# Patient Record
Sex: FEMALE | Race: WHITE | Employment: FULL TIME | URBAN - METROPOLITAN AREA
[De-identification: names, ages, dates, MRNs, and addresses within clinical notes are randomized per-mention and may not be internally consistent; named-entity substitution may affect disease eponyms.]

---

## 2017-01-17 LAB
HEPATITIS B SURFACE ANTIGEN INTERPRETATION: NORMAL
HEPATITIS C ANTIBODY INTERPRETATION: NORMAL

## 2017-01-18 LAB
HIV-1 AND HIV-2 ANTIBODIES: NORMAL
RPR: NORMAL

## 2018-03-16 LAB
HPV COMMENT: NORMAL
HPV TYPE 16: NOT DETECTED
HPV TYPE 18: NOT DETECTED
HPVOH (OTHER TYPES): NOT DETECTED

## 2020-05-15 LAB
GONADOTROPIN, CHORIONIC (HCG) QUANT: <5 MIU/ML
PROLACTIN: 12.9 NG/ML

## 2021-05-07 LAB
ABO/RH: NORMAL
ANTIBODY SCREEN: NORMAL
HEPATITIS C ANTIBODY INTERPRETATION: NORMAL
TSH SERPL DL<=0.05 MIU/L-ACNC: 2.85 UIU/ML (ref 0.27–4.2)

## 2021-05-08 LAB
BASOPHILS ABSOLUTE: 0 K/UL (ref 0–0.2)
BASOPHILS RELATIVE PERCENT: 0.3 %
EOSINOPHILS ABSOLUTE: 0.2 K/UL (ref 0–0.6)
EOSINOPHILS RELATIVE PERCENT: 1.3 %
ESTIMATED AVERAGE GLUCOSE: 148.5 MG/DL
HBA1C MFR BLD: 6.8 %
HCT VFR BLD CALC: 40.6 % (ref 36–48)
HEMOGLOBIN: 13.7 G/DL (ref 12–16)
HEPATITIS B SURFACE ANTIGEN INTERPRETATION: ABNORMAL
HIV AG/AB: NORMAL
HIV ANTIGEN: NORMAL
HIV-1 ANTIBODY: NORMAL
HIV-2 AB: NORMAL
LYMPHOCYTES ABSOLUTE: 2.6 K/UL (ref 1–5.1)
LYMPHOCYTES RELATIVE PERCENT: 17.4 %
MCH RBC QN AUTO: 26.8 PG (ref 26–34)
MCHC RBC AUTO-ENTMCNC: 33.9 G/DL (ref 31–36)
MCV RBC AUTO: 79.2 FL (ref 80–100)
MONOCYTES ABSOLUTE: 0.8 K/UL (ref 0–1.3)
MONOCYTES RELATIVE PERCENT: 5.3 %
NEUTROPHILS ABSOLUTE: 11.3 K/UL (ref 1.7–7.7)
NEUTROPHILS RELATIVE PERCENT: 75.7 %
PDW BLD-RTO: 14.4 % (ref 12.4–15.4)
PLATELET # BLD: 258 K/UL (ref 135–450)
PMV BLD AUTO: 9.6 FL (ref 5–10.5)
RBC # BLD: 5.13 M/UL (ref 4–5.2)
RUBELLA ANTIBODY IGG: 62.6 IU/ML
TOTAL SYPHILLIS IGG/IGM: ABNORMAL
VARICELLA-ZOSTER VIRUS AB, IGG: NORMAL
WBC # BLD: 14.9 K/UL (ref 4–11)

## 2021-05-09 LAB
ORGANISM: ABNORMAL
URINE CULTURE, ROUTINE: ABNORMAL
URINE CULTURE, ROUTINE: ABNORMAL

## 2021-05-24 LAB
A/G RATIO: 1.5 (ref 1.1–2.2)
ALBUMIN SERPL-MCNC: 4.3 G/DL (ref 3.4–5)
ALP BLD-CCNC: 57 U/L (ref 40–129)
ALT SERPL-CCNC: 21 U/L (ref 10–40)
ANION GAP SERPL CALCULATED.3IONS-SCNC: 16 MMOL/L (ref 3–16)
AST SERPL-CCNC: 30 U/L (ref 15–37)
BILIRUB SERPL-MCNC: 0.7 MG/DL (ref 0–1)
BUN BLDV-MCNC: 7 MG/DL (ref 7–20)
CALCIUM SERPL-MCNC: 9.7 MG/DL (ref 8.3–10.6)
CHLORIDE BLD-SCNC: 99 MMOL/L (ref 99–110)
CO2: 21 MMOL/L (ref 21–32)
CREAT SERPL-MCNC: <0.5 MG/DL (ref 0.6–1.1)
CREATININE URINE: 289.2 MG/DL (ref 28–259)
GFR AFRICAN AMERICAN: >60
GFR NON-AFRICAN AMERICAN: >60
GLOBULIN: 2.8 G/DL
GLUCOSE BLD-MCNC: 111 MG/DL (ref 70–99)
POTASSIUM SERPL-SCNC: 3.9 MMOL/L (ref 3.5–5.1)
PROTEIN PROTEIN: 34 MG/DL
PROTEIN/CREAT RATIO: 0.1 MG/DL
SODIUM BLD-SCNC: 136 MMOL/L (ref 136–145)
TOTAL PROTEIN: 7.1 G/DL (ref 6.4–8.2)
URIC ACID, SERUM: 7.5 MG/DL (ref 2.6–6)

## 2021-06-07 ENCOUNTER — HOSPITAL ENCOUNTER (OUTPATIENT)
Age: 31
Discharge: HOME | End: 2021-06-07
Payer: COMMERCIAL

## 2021-06-07 PROCEDURE — 84156 ASSAY OF PROTEIN URINE: CPT

## 2021-06-08 LAB
24HR URINE VOLUME (ML): 1775 ML
CREATININE 24 HOUR URINE: 1.3 G/24HR (ref 0.6–1.5)
PROTEIN 24 HOUR URINE: 0.12 G/24HR

## 2021-07-07 ENCOUNTER — ROUTINE PRENATAL (OUTPATIENT)
Dept: PERINATAL CARE | Age: 31
End: 2021-07-07
Payer: COMMERCIAL

## 2021-07-07 VITALS
SYSTOLIC BLOOD PRESSURE: 126 MMHG | HEART RATE: 108 BPM | BODY MASS INDEX: 41.7 KG/M2 | WEIGHT: 228 LBS | DIASTOLIC BLOOD PRESSURE: 82 MMHG

## 2021-07-07 DIAGNOSIS — Z34.02 ENCOUNTER FOR SUPERVISION OF NORMAL FIRST PREGNANCY IN SECOND TRIMESTER: Primary | ICD-10-CM

## 2021-07-07 DIAGNOSIS — O24.319 MODIFIED WHITE CLASS B PREGESTATIONAL DIABETES MELLITUS: ICD-10-CM

## 2021-07-07 PROCEDURE — 76805 OB US >/= 14 WKS SNGL FETUS: CPT

## 2021-07-07 RX ORDER — CETIRIZINE HYDROCHLORIDE 10 MG/1
10 TABLET ORAL DAILY
Status: ON HOLD | COMMUNITY
End: 2021-11-15 | Stop reason: HOSPADM

## 2021-07-07 RX ORDER — ASPIRIN 81 MG/1
162 TABLET, CHEWABLE ORAL DAILY
Status: ON HOLD | COMMUNITY
End: 2021-11-15 | Stop reason: HOSPADM

## 2021-07-07 NOTE — PROGRESS NOTES
Level II USD, consult Class B DM,probable chronic HTN(was on no meds) now developing HA,with higher BP,started on labetolol. Denies bleeding, leaking of fluid or pain. Took Labetolol for 1 week. Stopped med on 7/1/21 due to pain breast. Checks B/P 3 times per day-brought B/P log. Checks blood sugars 5 times per day. Uses freestyle [address] most of the time, also checks blood sugar if [address] reading is questionable. At 220 p.m. called [facility], per Dr. Ulysses Cower order, Levothyroxine 75mcg to be taken along with 200 mcg tablet to total 275mcg per day #90,no RF. Called in 32 g 4mm BD Kady pen needles,pt. Uses 5 times per day. #450 + 1RF. Pharmacy states takes 8-10 days to mail out order. In order for pt. To start med now, order received from Dr. Hardik Tesfaye to  Call  [address] 75mcg Levothyroxine 1 tab daily to be taken along with 200 mcg tab to total 275mcg per day #30 no RF Pt. Was notified of med orders.

## 2021-07-07 NOTE — PROGRESS NOTES
[facility] CONSULTATION -- DIABETES COMANAGEMENT    Ms. Mateo Salazar presents for consultation and comanagement of her Class B pregestational diabetes and chronic hypertension. Raad is a 31y.o. year-old  at 15w2d weeks gestation. She was diagnosed with diabetes 7 years ago when she had blood sugars of 1200 and was diagnosed with DKA. The patient was treated with insulin for a couple of years, then was managed with metformin for the last 5 years. Shortly before she became pregnant, Hassie Shy was added. She stopped this with diagnosis of pregnancy. Insulin was started by her primary OB. Raad also has chronic hypertension diagnosed around the same time as her diabetes. She was initially on lisinopril, but did not require blood pressure medications once she quit smoking. In the pregnancy she was initially started on labetalol but had significant nipple pain, which can be seen with labetalol. She is now not on any blood pressure medications and her bp's are in range. PAST MEDICAL HISTORY:   Is significant for class B diabetes and chtn as above, also hypercholesterolemia, gout (stopped allopurinol with diagnosis of pregnancy) and psoriasis. The gout and the psoriasis are better in pregnancy. PAST SURGICAL HISTORY:  is significant for:  Lap uday, removal of a cyst from her brow when she was a child    PAST OBSTETRICAL HISTORY:   is significant for: a prior first trimester miscarriage that did not require D&C. Her current pregnancy has been uncomplicated to date. G1:      PAST GYNECOLOGICAL HISTORY:  is negative for STIs, abnormal PAP smears and LEEP/cone procedures. She does have a long history of PCOS and low or anovulation, and treatment of her diabetes has resulted in pregnancy. SOCIAL HISTORY:   She quit tobacco 2 years ago. no alcohol or other drug use. FAMILY HISTORY:  She has a strong family history of chronic hypertension and diabetes.   No immediate family members with VTE although one aunt had a PE. Her family history is otherwise negative birth defects, early childhood deaths and developmental delay. Of note, her half sister is a carrier for SMA. The patient has not been tested. MEDICATIONS (dosages reflect todays changes):   1. PNV 1 tablet orally daily. 2.  levothyroxine 200 mcg -- increased to 275 mcg today  3. Zyrtec  4. pnv  5. Aspirin 81 mg daily, instructed by her OB to increase to 162 mg daily next week  6. Insulin:    NPH 22 in am, 28 at bedtime    Log   12 u with breakfast, 10 u with lunch, 14 u with dinner      ALLERGIES:  Bactrim, keflex, phenergan, morphine      SUBJECTIVE:  She denies any complaints, and is not having nausea or vomiting or any vaginal bleeding. She denies any episodes of hypoglycemia. She has been checking her blood sugars 4-5x daily, and is trying to follow the diet to the best of her ability. She has not seen a dietician. OBJECTIVE:  Height  5'2''  Weight = 228 pounds Pulse =108, B/P = 126/82, Fetal cardiac activity confirmed on todays ultrasounds    BLOOD PRESSURE LOG:  bp ranging from 120-140/70-90s    SELF BLOOD GLUCOSE MONITORING(SBGM) dates:  6/18 thru 7/7/21  Fasting blood glucose    mg/dL                      1-hour post-breakfast  92 - 140, 201  mg/dL             1-hour post-lunch  112-143 mg/dL          1-hour post-dinner  108-164 mg/dL         2 am:       mg/dL         ULTRASOUND EXAMINATION  IN [facility] - (today):  A naranjo fetus, biometry consistent with gestation age established by prior ultrasound, amniotic fluid volume was normal.  The early gestational age precludes an adequate assessment of fetal anatomy.     LABORATORY VALUES (March 2021):                Hemoglobin A1C       6.8  Fructosamine    148  TSH      2.85  24-hour Total Protein   124 mg  Creatinine    < 0.5  ALT      21  AST     30  Uric acid     7.5    PNL:  O negative, Ab screen   negative  H/h  13.7/40  Rub immune  RPR nonreactive  Hep B S Ag    Negative  HIV     Negative  Hep C     negative    DISCUSSION:  Pre-gestational diabetes places the fetus at increased risk for anomalies, including cardiac anomalies and neural tube defects. The risk of anomalies is related to glycemic control in the preconception period and first trimester. Her HbA1c of 6.8 is associated with an overall low risk of fetal anomalies, likely not significantly elevated above the background risk. The pathophysiology of diabetes in pregnancy was reviewed. We discussed the increase in insulin requirements associated with increased hormone production by the growing placenta. The patient and I had an extensive discussion regarding the risks of diabetes in pregnancy including increased risks of fetal macrosomia and growth restriction,  hypoglycemia and electrolyte disturbances,  jaundice, and stillbirth. There is also an increased risk for preeclampsia,  delivery and complications of  delivery, including infection and wound complications. We discussed the need for excellent glycemic control and close maternal and fetal monitoring in pregnancy. ASSESSMENT:     1.   IUP @ 15 2/7 weeks EGA. 2.   Pregestational diabetes mellitus (Whites Class B ) with improving control  3. Chronic hypertension not requiring medications  4. Hypothyroidism  5. Obesity  6. Gout, stable without symptoms during the pregnancy    PLAN:   Recommend the patient be offered carrier screening for SMA. Consider genetic counseling referral if desired.     Continue diet and self-blood sugar monitoring. I discussed the importance of compliance with diet including getting in all meals and snacks, whole grains in place of enriched flour/sweets, and portion control. I have not adjusted her insulin doses today as they were just changed 2 days ago, and her blood sugars look better the last today.      Referral to dietician would be beneficial.   Return to Maternal-Fetal Office in 4-5 weeks for follow-up consultation and detailed ultrasound   Patient has been instructed to fax or call the Maternal-Fetal Office with SBGM values at least weekly or should preprandial glucose values persistently rise > 90 mg/dL or 1- hour postprandial glucose values rise > 130 mg/dL.  The patient should receive a TDaP vaccine between 27 and 36 weeks gestation, as recommended by [facility] Number 566, June 2013 and the [facility].  The patient should also receive a flu vaccine for the 4735-1066 flu season as recommended by ACOG and the CDC.  I discussed COVID vaccine with the patient. The vaccine is recommended in pregnancy, as the benefits likely outweigh any risks. This is especially true for this patient who is at increased risk for severe COVID due to her obesity and diabetes and history of smoking.     Check TSH and free T4 monthly   I have increased her synthroid from 200 to 275 due to her TSH of 2.85 in May. Target TSH is < 2.5 in pregnancy, and recommend titrating her synthroid up to TSH just below 2.5 and free T4 within normal range.     Recommend baseline maternal ECHO/ECG due to her history of class B diabetes, hyperlipidemia, and suspected chronic hypertension.  She has completed baseline preeclampsia labs and 24 hr urine.  Target blood pressure during pregnancy is 120-140/70-90. Antihypertensive medications can be used if needed to keep her in that range. I would avoid labetalol due to her uncomfortable symptoms of breast/ nipple pain with that medication. Procardia XL is an excellent choice in pregnancy.     Recommend detailed ultrasound at 20 weeks gestation to assess fetal anatomy.  Recommend fetal echo be done around 22-24 weeks gestation to assess fetal cardiac anatomy.  Recommend ultrasound examinations every 4 weeks thereafter to assess fetal growth (i.e. at 24, 28, 32, 36 weeks gestation).

## 2021-07-07 NOTE — PROGRESS NOTES
Order was placed today for diabetic edu/diet at [facility].  states someone will call pt. Back to schedule appt. Pt. Notified.

## 2021-07-14 ENCOUNTER — TELEPHONE (OUTPATIENT)
Dept: PERINATAL CARE | Age: 31
End: 2021-07-14

## 2021-07-14 NOTE — TELEPHONE ENCOUNTER
Pt. Emailed her blood sugar and blood pressure log this morning from past week. Dr. Marlene Mak reviewed and made the following changes. AM NPH 26 units  PM NPH 34 units. Pt. States she has not scheduled diabetic edu class because none of the appts offered work with her schedule. Stressed importance of needing to schedule this class.

## 2021-07-21 ENCOUNTER — TELEPHONE (OUTPATIENT)
Dept: PERINATAL CARE | Age: 31
End: 2021-07-21

## 2021-07-21 NOTE — TELEPHONE ENCOUNTER
Pt. Emailed blood sugar and blood pressure log from past week today. Pt. Clarified she is taking 28 units NPH with breakfast. Dr. Tatyana Rhodes reviewed logs and increased bedtime NPH to 36 units. Pt. agrees with plan.

## 2021-07-22 ENCOUNTER — TELEPHONE (OUTPATIENT)
Dept: PERINATAL CARE | Age: 31
End: 2021-07-22

## 2021-07-22 NOTE — TELEPHONE ENCOUNTER
Called 1 month supply NPH Humulin N quick pen 28 units every a.m. with breakfast and 36 units every bedtime to [facility] pharmacist.

## 2021-07-28 ENCOUNTER — TELEPHONE (OUTPATIENT)
Dept: PERINATAL CARE | Age: 31
End: 2021-07-28

## 2021-07-28 NOTE — TELEPHONE ENCOUNTER
reviewed B/P and bloodsugar logs from past week that pt. Emailed. Dr. Pastor Favors increased bedtime NPH insulin to 38 units. Pt. notified and agrees with plan.

## 2021-08-04 ENCOUNTER — ROUTINE PRENATAL (OUTPATIENT)
Dept: PERINATAL CARE | Age: 31
End: 2021-08-04
Payer: COMMERCIAL

## 2021-08-04 VITALS — WEIGHT: 234.8 LBS | DIASTOLIC BLOOD PRESSURE: 90 MMHG | SYSTOLIC BLOOD PRESSURE: 135 MMHG | BODY MASS INDEX: 42.95 KG/M2

## 2021-08-04 DIAGNOSIS — O24.414 WHITE CLASSIFICATION A2 GESTATIONAL DIABETES MELLITUS (GDM), INSULIN CONTROLLED: ICD-10-CM

## 2021-08-04 DIAGNOSIS — O10.919 CHRONIC HYPERTENSION AFFECTING PREGNANCY: ICD-10-CM

## 2021-08-04 PROCEDURE — 76816 OB US FOLLOW-UP PER FETUS: CPT

## 2021-08-18 ENCOUNTER — TELEPHONE (OUTPATIENT)
Dept: PERINATAL CARE | Age: 31
End: 2021-08-18

## 2021-08-18 NOTE — TELEPHONE ENCOUNTER
Dr. Harriet Mcguire reviewed blood sugar log from previous week that pt. Emailed this morning. Bedtime NPH insulin was increased to 38 units. Pt. Michaela Ajph with plan. Pt. Was advised to call her OB office today to make an appt. Spoke to Jose Cotto at [address] [facility] earlier and she stated pt.  Did not have any future appt.'s.

## 2021-08-25 ENCOUNTER — TELEPHONE (OUTPATIENT)
Dept: PERINATAL CARE | Age: 31
End: 2021-08-25

## 2021-09-01 ENCOUNTER — ROUTINE PRENATAL (OUTPATIENT)
Dept: PERINATAL CARE | Age: 31
End: 2021-09-01
Payer: COMMERCIAL

## 2021-09-01 VITALS
HEART RATE: 101 BPM | BODY MASS INDEX: 43.71 KG/M2 | WEIGHT: 239 LBS | SYSTOLIC BLOOD PRESSURE: 123 MMHG | DIASTOLIC BLOOD PRESSURE: 76 MMHG

## 2021-09-01 DIAGNOSIS — Z3A.23 23 WEEKS GESTATION OF PREGNANCY: ICD-10-CM

## 2021-09-01 DIAGNOSIS — Z34.02 ENCOUNTER FOR SUPERVISION OF NORMAL FIRST PREGNANCY IN SECOND TRIMESTER: Primary | ICD-10-CM

## 2021-09-01 DIAGNOSIS — O24.012 PRE-EXISTING TYPE 1 DIABETES MELLITUS DURING PREGNANCY IN SECOND TRIMESTER: ICD-10-CM

## 2021-09-01 DIAGNOSIS — O10.919 CHRONIC HYPERTENSION AFFECTING PREGNANCY: ICD-10-CM

## 2021-09-01 LAB
T4 FREE: 1.3 NG/DL (ref 0.9–1.8)
TSH SERPL DL<=0.05 MIU/L-ACNC: 1.34 UIU/ML (ref 0.27–4.2)

## 2021-09-01 PROCEDURE — 76827 ECHO EXAM OF FETAL HEART: CPT

## 2021-09-01 PROCEDURE — 93325 DOPPLER ECHO COLOR FLOW MAPG: CPT

## 2021-09-01 PROCEDURE — 76815 OB US LIMITED FETUS(S): CPT

## 2021-09-01 PROCEDURE — 76825 ECHO EXAM OF FETAL HEART: CPT

## 2021-09-01 NOTE — PROGRESS NOTES
[facility] co-management    Ms. Gates presents for consultation and comanagement of her Class B pregestational diabetes and chronic hypertension. Raad is a 31y.o. year-old  at 21 w2d weeks gestation. She was diagnosed with diabetes 7 years ago when she had blood sugars of 1200 and was diagnosed with DKA. Raad also has chronic hypertension diagnosed around the same time as her diabetes.           MEDICATIONS (dosages reflect todays changes):   1. PNV 1 tablet orally daily. 2.  levothyroxine 275 mcg  3. Zyrtec  4. pnv  5. Aspirin 81 mg daily, instructed by her OB to increase to 162 mg daily next week  6. Insulin:                    NPH 28 in am, 38 at bedtime                    Log   12 u with breakfast, 10 u with lunch, 14 u with dinner             SUBJECTIVE:  She denies any complaints, and is not having nausea or vomiting or any vaginal bleeding. She denies any episodes of hypoglycemia. She has been checking her blood sugars 8x daily, and is trying to follow the diet to the best of her ability.       OBJECTIVE:  Height  5'2''  Weight = 228 pounds Pulse =101, B/P = 123/76, Fetal cardiac activity confirmed on todays ultrasounds     BLOOD PRESSURE LOG:  bp ranging from 130s-150s/ 80-90s     SELF BLOOD GLUCOSE MONITORING(SBGM) dates: -  Fasting blood glucose               mg/dL                      1-hour post-breakfast              101-123  mg/dL             1-hour post-lunch                     mg/dL          1-hour post-dinner                   mg/dL         2 am:                                       73-98 mg/dL     All pre meals are wnl.         ULTRASOUND EXAMINATION  [facility] - (today): Fetal echo performed. Sent under separate cover.     LABORATORY VALUES (2021):    24 hr urine 124 mg     DISCUSSION:  I applauded her on her strong work. We discussed as long as bp are consistently <150s/110s she can remain off meds.   If meds are indicated, recommend procardia due to her reaction to labetalol     ASSESSMENT:     1.   IUP @ 23 2/7 weeks EGA. 2.   Pregestational diabetes mellitus (Whites Class B ) with improving control  3. Chronic hypertension not requiring medications  4. Hypothyroidism  5. Obesity  6. Gout, stable without symptoms during the pregnancy     PLAN:  · Recommend the patient be offered carrier screening for SMA. Consider genetic counseling referral if desired. · Continue diet and self-blood sugar monitoring. I discussed the importance of compliance with diet including getting in all meals and snacks, whole grains in place of enriched flour/sweets, and portion control. I have not adjusted her insulin doses today . · Referral to dietician would be beneficial.  · Return to Maternal-Fetal Office in 5 weeks for follow-up consultation and ultrasound  · Patient has been instructed to fax or call the Maternal-Fetal Office with SBGM values at least weekly or should preprandial glucose values persistently rise > 90 mg/dL or 1- hour postprandial glucose values rise > 130 mg/dL. · The patient should receive a TDaP vaccine between 27 and 36 weeks gestation, as recommended by [facility] Number 289, June 2013 and the [facility]. · The patient should also receive a flu vaccine for the  flu season as recommended by ACOG and the CDC. · I discussed COVID vaccine with the patient. The vaccine is recommended in pregnancy, as the benefits likely outweigh any risks. This is especially true for this patient who is at increased risk for severe COVID due to her obesity and diabetes and history of smoking. · Check TSH and free T4 monthly--being drawn today. Patient states HgBa1c is also being drawn. · Recommend baseline maternal ECHO/ECG due to her history of class B diabetes, hyperlipidemia, and suspected chronic hypertension. · Target blood pressure during pregnancy is 120-140/70-90. Antihypertensive medications can be used if needed to keep her in that range. I would avoid labetalol due to her uncomfortable symptoms of breast/ nipple pain with that medication. Procardia XL is an excellent choice in pregnancy. · Recommend ultrasound examinations every 4 weeks thereafter to assess fetal growth (i.e. at 24, 28, 32, 36 weeks gestation). · Recommend fetal movement charting twice daily beginning at 28 weeks gestation and continuing until delivery, as recommended in all pregnancies during the third trimester. · Recommend institution of either twice weekly NST with weekly CHEMA, or weekly BPP with NST, beginning at 32 weeks gestation and continuing until delivery. Earlier institution of fetal testing may be warranted if there is any evidence of suboptimal fetal growth or deterioration of maternal status (i.e., the development of pregnancy-induced hypertension). · Recommend initiation of the delivery process at 38 weeks gestation due to chtn and diabetes.   Earlier delivery may be warranted if the patient has worsening blood pressures, evidence of preeclampsia, fetal growth restriction, or if we cannot achieve optimal control of her diabetes and there is macrosomia or polyhydramnios noted.        TIME SPENT IN CONSULTATION:   Total time spent  minutes: 40 min  Pre visit time, 10 minutes, visit time, 20 minutes,  post visit time, 10 minutes

## 2021-09-01 NOTE — PROGRESS NOTES
Ultrasound, fetal echo, blood sugars reviewed, Increased fasting this am 110. Copies on chart for MD review. Denies contractions bleeding or leaking + fetal movement temp 97.8 . Blood sugars reviewed by Dr. Lori Dang, no changes in Insulin dosages.

## 2021-09-02 LAB
ESTIMATED AVERAGE GLUCOSE: 96.8 MG/DL
HBA1C MFR BLD: 5 %

## 2021-09-08 ENCOUNTER — TELEPHONE (OUTPATIENT)
Dept: PERINATAL CARE | Age: 31
End: 2021-09-08

## 2021-09-08 NOTE — TELEPHONE ENCOUNTER
Pt. Was notified of the following insulin changes after Dr. Jai Dougherty reviewed her blood sugar log from the past week. Copy of changes were scanned to pt.   NPH Bfast-32u  NPH bedtime-42u  Humalog Bfast-14u  Humalog Lunch 12u   Humalog dinner 18u

## 2021-09-09 ENCOUNTER — TELEPHONE (OUTPATIENT)
Dept: PERINATAL CARE | Age: 31
End: 2021-09-09

## 2021-09-09 NOTE — TELEPHONE ENCOUNTER
Spoke to Fozia at [facility]. Per order of Dr. Zehra Diamond called RF NPH Hum N quickpen 32 u with breakfast,42 u @ bedtime. #30 days + 1 RF.  Humalog quickpen 14u breakfast,12u lunch,18u dinner #30 days + 1 RF

## 2021-09-09 NOTE — TELEPHONE ENCOUNTER
Clarified Humulin N quickpen and Humalog quickpen quantity from Rx called this morning.  90 day supply ordered for each per pharmacy request.

## 2021-09-15 ENCOUNTER — TELEPHONE (OUTPATIENT)
Dept: PERINATAL CARE | Age: 31
End: 2021-09-15

## 2021-09-15 NOTE — TELEPHONE ENCOUNTER
Called pt. After Dr. Nasreen Walton reviewed her blood sugar and blood pressure  log from past week. Pt. Was notified to make the following changes. Breakfast  NPH 36U  Breakfast H log 18U  Lunch H log 14U  Dinner H log 20U  Bedtime NPH 48U  Pt. Was concerned about her blood sugar results being higher. She states her diet has not changed and she has replaced old insulin pen with new last week and that she is taking the correct dosage of insulin. Pt. Was advised to call office before next Wednesday if her bloosd sugar values are consistently out of range. Pt. Chauncey Severe with plan.

## 2021-09-20 ENCOUNTER — TELEPHONE (OUTPATIENT)
Dept: PERINATAL CARE | Age: 31
End: 2021-09-20

## 2021-09-20 NOTE — TELEPHONE ENCOUNTER
Returned pt. Call after she left message that her blood sugars were running high. Pt. Send blood sugar log from 9/15 to 9/20 via email. Blood sugar log was sent to Dr. Rip To. Received phone order from Dr. Rip To to increase her insulin doses to the following. AM NPH 46 units  AM H Log 24 units  Lunch H log 24 units  Dinner H log 24 units  Bedtime NPH 52 units    Pt. Was notified of the changes via telephone and email and has numbers to call to reach [facility] office for abnormal blood sugar readings or concerns.

## 2021-09-22 ENCOUNTER — TELEPHONE (OUTPATIENT)
Dept: PERINATAL CARE | Age: 31
End: 2021-09-22

## 2021-09-22 NOTE — TELEPHONE ENCOUNTER
Pt. Was notified of the following medication changes after Dr. Arthur Carter reviewed her blood sugar log from the past week. Increase NPH morning insulin to 50 units  Pt. Was advised to call for consistent abnormal blood sugar values. PtAshley Ajph with plan.

## 2021-09-29 ENCOUNTER — TELEPHONE (OUTPATIENT)
Dept: PERINATAL CARE | Age: 31
End: 2021-09-29

## 2021-09-29 NOTE — TELEPHONE ENCOUNTER
Pt. Was notified that Dr. Leoncio Hines reviewed her blood sugar log from the past week and there are no medication changes.

## 2021-10-06 ENCOUNTER — ROUTINE PRENATAL (OUTPATIENT)
Dept: PERINATAL CARE | Age: 31
End: 2021-10-06
Payer: COMMERCIAL

## 2021-10-06 VITALS
SYSTOLIC BLOOD PRESSURE: 111 MMHG | WEIGHT: 249.8 LBS | DIASTOLIC BLOOD PRESSURE: 77 MMHG | BODY MASS INDEX: 45.69 KG/M2 | HEART RATE: 102 BPM

## 2021-10-06 DIAGNOSIS — E03.9 HYPOTHYROID IN PREGNANCY, ANTEPARTUM, THIRD TRIMESTER: ICD-10-CM

## 2021-10-06 DIAGNOSIS — O99.213 OBESITY AFFECTING PREGNANCY IN THIRD TRIMESTER, ANTEPARTUM: ICD-10-CM

## 2021-10-06 DIAGNOSIS — O99.283 HYPOTHYROID IN PREGNANCY, ANTEPARTUM, THIRD TRIMESTER: ICD-10-CM

## 2021-10-06 DIAGNOSIS — O24.414 WHITE CLASSIFICATION A2 GESTATIONAL DIABETES MELLITUS (GDM), INSULIN CONTROLLED: ICD-10-CM

## 2021-10-06 DIAGNOSIS — Z34.02 ENCOUNTER FOR SUPERVISION OF NORMAL FIRST PREGNANCY IN SECOND TRIMESTER: Primary | ICD-10-CM

## 2021-10-06 DIAGNOSIS — O10.919 CHRONIC HYPERTENSION AFFECTING PREGNANCY: ICD-10-CM

## 2021-10-06 PROBLEM — O24.319 PREGESTATIONAL DIABETES MELLITUS, MODIFIED WHITE CLASS B: Status: ACTIVE | Noted: 2021-08-04

## 2021-10-06 PROCEDURE — 76816 OB US FOLLOW-UP PER FETUS: CPT

## 2021-10-06 NOTE — PROGRESS NOTES
 insulin NPH (HUMULIN N;NOVOLIN N) 100 UNIT/ML injection vial Inject 28 Units into the skin 2 times daily (before meals) Indications: 50u brkfst , 52ubedtime        No current facility-administered medications for this visit. ALLERGIES:   Allergies   Allergen Reactions    Bactrim [Sulfamethoxazole-Trimethoprim]     Keflex [Cephalexin]     Phenergan [Promethazine Hcl] Hives    Morphine Anxiety       MEDICAL HISTORY:   Past Medical History:   Diagnosis Date    Diabetes mellitus ([address]     Hypertension     PCOS (polycystic ovarian syndrome)     Purpura allergic     Thyroid disorder     hypothyroid       SURGICAL HISTORY:   Past Surgical History:   Procedure Laterality Date    CHOLECYSTECTOMY      EYE SURGERY      cyst removed from right       SOCIAL HISTORY:   Social History     Socioeconomic History    Marital status:      Spouse name: Not on file    Number of children: Not on file    Years of education: Not on file    Highest education level: Not on file   Occupational History    Not on file   Tobacco Use    Smoking status: Former Smoker     Packs/day: 1.00     Types: Cigarettes    Smokeless tobacco: Never Used   Vaping Use    Vaping Use: Former   Substance and Sexual Activity    Alcohol use: Never    Drug use: Never    Sexual activity: Not on file   Other Topics Concern    Not on file   Social History Narrative    Not on file     Social Determinants of Health     Financial Resource Strain:     Difficulty of Paying Living Expenses:    Food Insecurity:     Worried About Running Out of Food in the Last Year:     Ran Out of Food in the Last Year:    Transportation Needs:     Lack of Transportation (Medical):      Lack of Transportation (Non-Medical):    Physical Activity:     Days of Exercise per Week:     Minutes of Exercise per Session:    Stress:     Feeling of Stress :    Social Connections:     Frequency of Communication with Friends and Family:     Frequency of Social Gatherings with Friends and Family:     Attends Temple Services:     Active Member of Clubs or Organizations:     Attends Club or Organization Meetings:     Marital Status:    Intimate Partner Violence:     Fear of Current or Ex-Partner:     Emotionally Abused:     Physically Abused:     Sexually Abused:        OBJECTIVE:   /77   Pulse 102   Wt 249 lb 12.8 oz (113.3 kg)   LMP 2021   BMI 45.69 kg/m²     GEN: NAD, A+O x 3  RESP: no distress  ABD: soft, obese, NT, gravid with approp FH. FHT visualized on US  EXT: warm and well perfused, no edema    ULTRASOUND FINDINGS: (today)   Live naranjo male fetus in a complete breech presentation. EFW > 99th%. CHEMA 23 cm. Placenta anterior. No anatomic abnormalities identified.     GLYCEMIC LOG: (21 - 10/6/21)  - Fastin - 115   (6/8 abnormal)  - 1hr PP Bkfst:  111 - 143    (1/7 abnormal)  - Pre Lunch:   98 - 120   (1/7 abnormal)  - 1hr PP Lunch:  119 - 134   (0 abnormal)  - Pre Dinner:   87 - 138   (2/7 abnormal)  - 1hr PP Dinner: 102 - 162   (2/7 abnormal)  - Bedtime / 3 am:     79 - 137   (3 abnormal)    Recent Results (from the past 1344 hour(s))   T4, Free    Collection Time: 21  9:08 AM   Result Value Ref Range    T4 Free 1.3 0.9 - 1.8 ng/dL   TSH without Reflex    Collection Time: 21  9:08 AM   Result Value Ref Range    TSH 1.34 0.27 - 4.20 uIU/mL   Hemoglobin A1C    Collection Time: 21  9:08 AM   Result Value Ref Range    Hemoglobin A1C 5.0 See comment %    eAG 96.8 mg/dL       IMPRESSION:   Kristie Grijalva is a 31 y.o.  at 34w4d with a naranjo intrauterine gestation and good obstetric dating  1) Class B Pregestational DM (Type 2) with good control based on her glycemic log and HgbA1c, but with fetal macrosomia - she is making good meal choices and is very diligent about monitoring  2) CHTN - stable  3) Hypothyroidism - stable with reassuring TFTs  4) Obesity in pregnancy (weight gain is roughly 25 lbs in preg)  5) Gout - stable    RECOMMENDATIONS:   · Continue current ADA diet with 7 point FSBS monitoring  · The only change made to her glycemic management today involved increasing her night time NPH to 56 units. Positive encouragement given regarding her meal choices and daytime glycemic control   · Continue to fax or call in her FSBS values weekly to our office  · She was instructed on the proper goals for pre-prandial values of ~100 gm/dl, fasting values of < 95 gm/dl, and 1 hr post-prandial values < 140 gm/dl  · Serial OB ultrasounds every 4 weeks to follow interval growth   · Serial Hgb A1c measurements every 8 weeks  · Fetal echocardiogram done  · Continue Aspirin for preeclampsia prevention  · Continue current levothyroxine dosage  · Twice daily fetal kick counts from now through delivery  · Initiate twice weekly  testing at 32 weeks and continuing through delivery  · Plan for delivery ~39.0 weeks and perhaps sooner for evidence of suboptimal control (poor glycemic log, macrosomia, polyhydramnios, etc.. )  · Return in 4-6 weeks for follow-up    Thank you for allowing me to participate in the care of this patient. Please feel free to contact me for any further questions or concerns.      Franklin Tabares MD    10/6/2021    Total face-to-face time spent: 25 min (pre-visit: 5 min, post-visit: 5 min = total 35 min)

## 2021-10-06 NOTE — PROGRESS NOTES
Here for diabetic F/U,brought blood sugar log. Reports + fetal movement, denies bleeding, leaking of fluid or ctx.

## 2021-10-10 ENCOUNTER — HOSPITAL ENCOUNTER (OUTPATIENT)
Age: 31
Discharge: HOME | End: 2021-10-10
Attending: OBSTETRICS & GYNECOLOGY | Admitting: OBSTETRICS & GYNECOLOGY
Payer: COMMERCIAL

## 2021-10-10 VITALS
SYSTOLIC BLOOD PRESSURE: 140 MMHG | HEART RATE: 105 BPM | DIASTOLIC BLOOD PRESSURE: 84 MMHG | TEMPERATURE: 98.4 F | RESPIRATION RATE: 18 BRPM

## 2021-10-10 LAB — RHIG LOT NUMBER: NORMAL

## 2021-10-10 PROCEDURE — 6360000002 HC RX W HCPCS: Performed by: OBSTETRICS & GYNECOLOGY

## 2021-10-10 PROCEDURE — 96372 THER/PROPH/DIAG INJ SC/IM: CPT

## 2021-10-10 RX ADMIN — HUMAN RHO(D) IMMUNE GLOBULIN 300 MCG: 300 INJECTION, SOLUTION INTRAMUSCULAR at 13:25

## 2021-10-13 ENCOUNTER — TELEPHONE (OUTPATIENT)
Dept: PERINATAL CARE | Age: 31
End: 2021-10-13

## 2021-10-13 NOTE — TELEPHONE ENCOUNTER
Called pt. After Dr. Malcolm Magallanes reviewed her blood sugar log from past week. Pt. Informed Dr. Malcolm Magallanes made the following changes. Increase AM NPH 58 Units  Increase PM NPH 64 Units  Hlog- no changes. Pt. Carri Barrios with plan.

## 2021-10-20 ENCOUNTER — TELEPHONE (OUTPATIENT)
Dept: PERINATAL CARE | Age: 31
End: 2021-10-20

## 2021-10-20 NOTE — TELEPHONE ENCOUNTER
Message left for [facility] to return call to review changes  In Insulin dosing made by Dr. Willis Winston after review of blood sugar log. Changes also emailed to [facility].

## 2021-10-27 ENCOUNTER — TELEPHONE (OUTPATIENT)
Dept: PERINATAL CARE | Age: 31
End: 2021-10-27

## 2021-11-03 ENCOUNTER — ROUTINE PRENATAL (OUTPATIENT)
Dept: PERINATAL CARE | Age: 31
End: 2021-11-03
Payer: COMMERCIAL

## 2021-11-03 ENCOUNTER — HOSPITAL ENCOUNTER (INPATIENT)
Age: 31
LOS: 15 days | Discharge: HOME | End: 2021-11-18
Attending: OBSTETRICS & GYNECOLOGY | Admitting: OBSTETRICS & GYNECOLOGY
Payer: COMMERCIAL

## 2021-11-03 VITALS
HEART RATE: 108 BPM | BODY MASS INDEX: 49.2 KG/M2 | DIASTOLIC BLOOD PRESSURE: 100 MMHG | SYSTOLIC BLOOD PRESSURE: 173 MMHG | WEIGHT: 269 LBS

## 2021-11-03 DIAGNOSIS — O10.919 CHRONIC HYPERTENSION AFFECTING PREGNANCY: ICD-10-CM

## 2021-11-03 DIAGNOSIS — E03.9 HYPOTHYROID IN PREGNANCY, ANTEPARTUM, THIRD TRIMESTER: ICD-10-CM

## 2021-11-03 DIAGNOSIS — O99.283 HYPOTHYROID IN PREGNANCY, ANTEPARTUM, THIRD TRIMESTER: ICD-10-CM

## 2021-11-03 DIAGNOSIS — O99.213 OBESITY AFFECTING PREGNANCY IN THIRD TRIMESTER, ANTEPARTUM: ICD-10-CM

## 2021-11-03 DIAGNOSIS — O24.319 PREGESTATIONAL DIABETES MELLITUS, MODIFIED WHITE CLASS B: ICD-10-CM

## 2021-11-03 PROBLEM — O13.3 GESTATIONAL HYPERTENSION, THIRD TRIMESTER: Status: ACTIVE | Noted: 2021-11-03

## 2021-11-03 LAB
ALBUMIN SERPL-MCNC: 3.4 G/DL (ref 3.4–5)
ALP BLD-CCNC: 140 U/L (ref 40–129)
ALT SERPL-CCNC: 9 U/L (ref 10–40)
AMPHETAMINE SCREEN, URINE: NORMAL
ANION GAP SERPL CALCULATED.3IONS-SCNC: 12 MMOL/L (ref 3–16)
AST SERPL-CCNC: 18 U/L (ref 15–37)
BARBITURATE SCREEN URINE: NORMAL
BENZODIAZEPINE SCREEN, URINE: NORMAL
BILIRUB SERPL-MCNC: 0.5 MG/DL (ref 0–1)
BILIRUBIN DIRECT: <0.2 MG/DL (ref 0–0.3)
BILIRUBIN, INDIRECT: ABNORMAL MG/DL (ref 0–1)
BUN BLDV-MCNC: 6 MG/DL (ref 7–20)
BUPRENORPHINE URINE: NORMAL
CALCIUM SERPL-MCNC: 9.3 MG/DL (ref 8.3–10.6)
CANNABINOID SCREEN URINE: NORMAL
CHLORIDE BLD-SCNC: 102 MMOL/L (ref 99–110)
CO2: 21 MMOL/L (ref 21–32)
COCAINE METABOLITE SCREEN URINE: NORMAL
CREAT SERPL-MCNC: <0.5 MG/DL (ref 0.6–1.1)
CREATININE URINE: 45.3 MG/DL (ref 28–259)
GFR AFRICAN AMERICAN: >60
GFR NON-AFRICAN AMERICAN: >60
GLUCOSE BLD-MCNC: 118 MG/DL (ref 70–99)
GLUCOSE BLD-MCNC: 122 MG/DL (ref 70–99)
GLUCOSE BLD-MCNC: 207 MG/DL (ref 70–99)
GLUCOSE BLD-MCNC: 90 MG/DL (ref 70–99)
HCT VFR BLD CALC: 37.9 % (ref 36–48)
HEMOGLOBIN: 12.7 G/DL (ref 12–16)
Lab: NORMAL
MCH RBC QN AUTO: 26.8 PG (ref 26–34)
MCHC RBC AUTO-ENTMCNC: 33.5 G/DL (ref 31–36)
MCV RBC AUTO: 80.1 FL (ref 80–100)
METHADONE SCREEN, URINE: NORMAL
OPIATE SCREEN URINE: NORMAL
OXYCODONE URINE: NORMAL
PDW BLD-RTO: 14.6 % (ref 12.4–15.4)
PERFORMED ON: ABNORMAL
PERFORMED ON: ABNORMAL
PERFORMED ON: NORMAL
PH UA: 6
PHENCYCLIDINE SCREEN URINE: NORMAL
PLATELET # BLD: 157 K/UL (ref 135–450)
PMV BLD AUTO: 9.9 FL (ref 5–10.5)
POTASSIUM REFLEX MAGNESIUM: 3.7 MMOL/L (ref 3.5–5.1)
PROPOXYPHENE SCREEN: NORMAL
PROTEIN PROTEIN: 25 MG/DL
PROTEIN/CREAT RATIO: 0.6 MG/DL
RBC # BLD: 4.73 M/UL (ref 4–5.2)
SODIUM BLD-SCNC: 135 MMOL/L (ref 136–145)
TOTAL PROTEIN: 6.5 G/DL (ref 6.4–8.2)
URIC ACID, SERUM: 6.7 MG/DL (ref 2.6–6)
WBC # BLD: 11.6 K/UL (ref 4–11)

## 2021-11-03 PROCEDURE — 85027 COMPLETE CBC AUTOMATED: CPT

## 2021-11-03 PROCEDURE — 6360000002 HC RX W HCPCS: Performed by: OBSTETRICS & GYNECOLOGY

## 2021-11-03 PROCEDURE — 80048 BASIC METABOLIC PNL TOTAL CA: CPT

## 2021-11-03 PROCEDURE — 84550 ASSAY OF BLOOD/URIC ACID: CPT

## 2021-11-03 PROCEDURE — 51702 INSERT TEMP BLADDER CATH: CPT

## 2021-11-03 PROCEDURE — 6360000002 HC RX W HCPCS

## 2021-11-03 PROCEDURE — 80307 DRUG TEST PRSMV CHEM ANLYZR: CPT

## 2021-11-03 PROCEDURE — 82575 CREATININE CLEARANCE TEST: CPT

## 2021-11-03 PROCEDURE — 76815 OB US LIMITED FETUS(S): CPT

## 2021-11-03 PROCEDURE — 6370000000 HC RX 637 (ALT 250 FOR IP): Performed by: OBSTETRICS & GYNECOLOGY

## 2021-11-03 PROCEDURE — 1220000000 HC SEMI PRIVATE OB R&B

## 2021-11-03 PROCEDURE — 82570 ASSAY OF URINE CREATININE: CPT

## 2021-11-03 PROCEDURE — 80076 HEPATIC FUNCTION PANEL: CPT

## 2021-11-03 PROCEDURE — 36415 COLL VENOUS BLD VENIPUNCTURE: CPT

## 2021-11-03 PROCEDURE — 2580000003 HC RX 258: Performed by: OBSTETRICS & GYNECOLOGY

## 2021-11-03 PROCEDURE — 84156 ASSAY OF PROTEIN URINE: CPT

## 2021-11-03 RX ORDER — ACETAMINOPHEN 500 MG
1000 TABLET ORAL EVERY 6 HOURS PRN
Status: DISCONTINUED | OUTPATIENT
Start: 2021-11-03 | End: 2021-11-15

## 2021-11-03 RX ORDER — NIFEDIPINE 10 MG/1
10 CAPSULE ORAL
Status: COMPLETED | OUTPATIENT
Start: 2021-11-03 | End: 2021-11-03

## 2021-11-03 RX ORDER — DEXTROSE MONOHYDRATE 50 MG/ML
100 INJECTION, SOLUTION INTRAVENOUS PRN
Status: DISCONTINUED | OUTPATIENT
Start: 2021-11-03 | End: 2021-11-15

## 2021-11-03 RX ORDER — BETAMETHASONE DIPROPIONATE 0.05 %
OINTMENT (GRAM) TOPICAL 2 TIMES DAILY
Status: ON HOLD | COMMUNITY
Start: 2021-04-09 | End: 2021-11-15 | Stop reason: HOSPADM

## 2021-11-03 RX ORDER — DOCUSATE SODIUM 100 MG/1
100 CAPSULE, LIQUID FILLED ORAL 2 TIMES DAILY
Status: DISCONTINUED | OUTPATIENT
Start: 2021-11-03 | End: 2021-11-03

## 2021-11-03 RX ORDER — SODIUM CHLORIDE 0.9 % (FLUSH) 0.9 %
5-40 SYRINGE (ML) INJECTION EVERY 12 HOURS SCHEDULED
Status: DISCONTINUED | OUTPATIENT
Start: 2021-11-03 | End: 2021-11-03

## 2021-11-03 RX ORDER — HYDRALAZINE HYDROCHLORIDE 20 MG/ML
10 INJECTION INTRAMUSCULAR; INTRAVENOUS ONCE
Status: COMPLETED | OUTPATIENT
Start: 2021-11-03 | End: 2021-11-03

## 2021-11-03 RX ORDER — MAGNESIUM SULFATE IN WATER 40 MG/ML
2000 INJECTION, SOLUTION INTRAVENOUS ONCE
Status: COMPLETED | OUTPATIENT
Start: 2021-11-03 | End: 2021-11-03

## 2021-11-03 RX ORDER — FLASH GLUCOSE SCANNING READER
EACH MISCELLANEOUS
Status: ON HOLD | COMMUNITY
Start: 2021-06-11 | End: 2021-11-15 | Stop reason: HOSPADM

## 2021-11-03 RX ORDER — MAGNESIUM SULFATE IN WATER 40 MG/ML
4000 INJECTION, SOLUTION INTRAVENOUS ONCE
Status: COMPLETED | OUTPATIENT
Start: 2021-11-03 | End: 2021-11-03

## 2021-11-03 RX ORDER — ACETAMINOPHEN 500 MG
1000 TABLET ORAL 3 TIMES DAILY
Status: ON HOLD | COMMUNITY
Start: 2021-04-26 | End: 2021-11-15 | Stop reason: SDUPTHER

## 2021-11-03 RX ORDER — DEXTROSE MONOHYDRATE 25 G/50ML
12.5 INJECTION, SOLUTION INTRAVENOUS PRN
Status: DISCONTINUED | OUTPATIENT
Start: 2021-11-03 | End: 2021-11-15

## 2021-11-03 RX ORDER — NIFEDIPINE 30 MG/1
90 TABLET, EXTENDED RELEASE ORAL DAILY
Status: DISCONTINUED | OUTPATIENT
Start: 2021-11-03 | End: 2021-11-15

## 2021-11-03 RX ORDER — SODIUM CHLORIDE, SODIUM LACTATE, POTASSIUM CHLORIDE, CALCIUM CHLORIDE 600; 310; 30; 20 MG/100ML; MG/100ML; MG/100ML; MG/100ML
INJECTION, SOLUTION INTRAVENOUS CONTINUOUS
Status: DISCONTINUED | OUTPATIENT
Start: 2021-11-03 | End: 2021-11-03

## 2021-11-03 RX ORDER — NIFEDIPINE 30 MG/1
60 TABLET, EXTENDED RELEASE ORAL DAILY
Status: DISCONTINUED | OUTPATIENT
Start: 2021-11-03 | End: 2021-11-03

## 2021-11-03 RX ORDER — LEVOTHYROXINE SODIUM 137 UG/1
275 CAPSULE ORAL DAILY
Status: DISCONTINUED | OUTPATIENT
Start: 2021-11-03 | End: 2021-11-03

## 2021-11-03 RX ORDER — CALCIUM GLUCONATE 94 MG/ML
1000 INJECTION, SOLUTION INTRAVENOUS PRN
Status: DISCONTINUED | OUTPATIENT
Start: 2021-11-03 | End: 2021-11-15

## 2021-11-03 RX ORDER — BETAMETHASONE SODIUM PHOSPHATE AND BETAMETHASONE ACETATE 3; 3 MG/ML; MG/ML
12 INJECTION, SUSPENSION INTRA-ARTICULAR; INTRALESIONAL; INTRAMUSCULAR; SOFT TISSUE EVERY 24 HOURS
Status: COMPLETED | OUTPATIENT
Start: 2021-11-03 | End: 2021-11-04

## 2021-11-03 RX ORDER — SODIUM CHLORIDE 9 MG/ML
INJECTION, SOLUTION INTRAVENOUS CONTINUOUS
Status: DISCONTINUED | OUTPATIENT
Start: 2021-11-03 | End: 2021-11-05

## 2021-11-03 RX ORDER — INSULIN LISPRO 100 [IU]/ML
12 INJECTION, SOLUTION INTRAVENOUS; SUBCUTANEOUS
Status: DISCONTINUED | OUTPATIENT
Start: 2021-11-03 | End: 2021-11-04

## 2021-11-03 RX ORDER — NIFEDIPINE 10 MG/1
20 CAPSULE ORAL
Status: COMPLETED | OUTPATIENT
Start: 2021-11-03 | End: 2021-11-03

## 2021-11-03 RX ORDER — ONDANSETRON 2 MG/ML
4 INJECTION INTRAMUSCULAR; INTRAVENOUS EVERY 6 HOURS PRN
Status: DISCONTINUED | OUTPATIENT
Start: 2021-11-03 | End: 2021-11-15

## 2021-11-03 RX ORDER — SODIUM CHLORIDE 9 MG/ML
25 INJECTION, SOLUTION INTRAVENOUS PRN
Status: DISCONTINUED | OUTPATIENT
Start: 2021-11-03 | End: 2021-11-15

## 2021-11-03 RX ORDER — NICOTINE POLACRILEX 4 MG
15 LOZENGE BUCCAL PRN
Status: DISCONTINUED | OUTPATIENT
Start: 2021-11-03 | End: 2021-11-15

## 2021-11-03 RX ORDER — HYDROXYZINE PAMOATE 25 MG/1
50 CAPSULE ORAL 3 TIMES DAILY PRN
Status: DISCONTINUED | OUTPATIENT
Start: 2021-11-03 | End: 2021-11-15

## 2021-11-03 RX ORDER — SIMETHICONE 80 MG
80 TABLET,CHEWABLE ORAL EVERY 6 HOURS PRN
Status: DISCONTINUED | OUTPATIENT
Start: 2021-11-03 | End: 2021-11-15

## 2021-11-03 RX ORDER — SODIUM CHLORIDE 0.9 % (FLUSH) 0.9 %
5-40 SYRINGE (ML) INJECTION PRN
Status: DISCONTINUED | OUTPATIENT
Start: 2021-11-03 | End: 2021-11-15

## 2021-11-03 RX ORDER — HYDRALAZINE HYDROCHLORIDE 20 MG/ML
INJECTION INTRAMUSCULAR; INTRAVENOUS
Status: COMPLETED
Start: 2021-11-03 | End: 2021-11-03

## 2021-11-03 RX ADMIN — MAGNESIUM SULFATE HEPTAHYDRATE 4000 MG: 40 INJECTION, SOLUTION INTRAVENOUS at 10:04

## 2021-11-03 RX ADMIN — ACETAMINOPHEN 1000 MG: 500 TABLET, FILM COATED ORAL at 15:21

## 2021-11-03 RX ADMIN — MAGNESIUM SULFATE HEPTAHYDRATE 2000 MG: 40 INJECTION, SOLUTION INTRAVENOUS at 10:16

## 2021-11-03 RX ADMIN — HYDRALAZINE HYDROCHLORIDE 10 MG: 20 INJECTION INTRAMUSCULAR; INTRAVENOUS at 15:38

## 2021-11-03 RX ADMIN — BETAMETHASONE SODIUM PHOSPHATE AND BETAMETHASONE ACETATE 12 MG: 3; 3 INJECTION, SUSPENSION INTRA-ARTICULAR; INTRALESIONAL; INTRAMUSCULAR at 12:30

## 2021-11-03 RX ADMIN — NIFEDIPINE 20 MG: 10 CAPSULE ORAL at 13:28

## 2021-11-03 RX ADMIN — NIFEDIPINE 90 MG: 30 TABLET, FILM COATED, EXTENDED RELEASE ORAL at 17:58

## 2021-11-03 RX ADMIN — NIFEDIPINE 20 MG: 10 CAPSULE ORAL at 14:58

## 2021-11-03 RX ADMIN — SODIUM CHLORIDE 25 ML: 9 INJECTION, SOLUTION INTRAVENOUS at 10:00

## 2021-11-03 RX ADMIN — ACETAMINOPHEN 1000 MG: 500 TABLET, FILM COATED ORAL at 23:25

## 2021-11-03 RX ADMIN — HYDRALAZINE HYDROCHLORIDE 10 MG: 20 INJECTION INTRAMUSCULAR; INTRAVENOUS at 16:25

## 2021-11-03 RX ADMIN — NIFEDIPINE 10 MG: 10 CAPSULE ORAL at 10:16

## 2021-11-03 ASSESSMENT — PAIN SCALES - GENERAL
PAINLEVEL_OUTOF10: 3
PAINLEVEL_OUTOF10: 2

## 2021-11-03 ASSESSMENT — PAIN DESCRIPTION - DESCRIPTORS
DESCRIPTORS: ACHING
DESCRIPTORS: ACHING

## 2021-11-03 NOTE — H&P
Department of Obstetrics and Gynecology   Obstetrics History and Physical        CHIEF COMPLAINT:  elevated blood pressure    HISTORY OF PRESENT ILLNESS:      The patient is a 27 y.o. female at 33w3d. OB History        1    Para        Term                AB        Living           SAB        TAB        Ectopic        Molar        Multiple        Live Births                Patient presents with a chief complaint as above and is being admitted for hypertension  Patient seen in M office and noted to have elevated BP, severe range. Currently with no ha/vision changes/RUQ pain.   Has had good fetal movement and no contractions  Estimated Due Date: Estimated Date of Delivery: 21    PRENATAL CARE:    Complicated by: Obesity  GDMA2-- NPH 60u am and 62uPM,  Novolog 24u breakfast, 28u lunch, 28u dinner  Hypothyroid  Hx hypertension-- given labetalol at 20 weeks with subsequent raynauds reactions, use procardia  for BP control  Breech presentation  LGA 3100g (11/3)    PAST OB HISTORY:  OB History        1    Para        Term                AB        Living           SAB        TAB        Ectopic        Molar        Multiple        Live Births                    Past Medical History:        Diagnosis Date    Diabetes mellitus ([address]     Hypertension     PCOS (polycystic ovarian syndrome)     Purpura allergic     Thyroid disorder     hypothyroid     Past Surgical History:        Procedure Laterality Date    CHOLECYSTECTOMY      EYE SURGERY      cyst removed from right     Allergies:  Cephalexin, Other, Sulfamethoxazole-trimethoprim, Hydromorphone, Phenergan [promethazine hcl], Promethazine hcl, and Morphine    Social History:    Social History     Socioeconomic History    Marital status:      Spouse name: Not on file    Number of children: Not on file    Years of education: Not on file    Highest education level: Not on file   Occupational History    Not on file   Tobacco Use    Smoking status: Former Smoker     Packs/day: 1.00     Types: Cigarettes    Smokeless tobacco: Never Used   Vaping Use    Vaping Use: Former   Substance and Sexual Activity    Alcohol use: Never    Drug use: Never    Sexual activity: Not on file   Other Topics Concern    Not on file   Social History Narrative    Not on file     Social Determinants of Health     Financial Resource Strain:     Difficulty of Paying Living Expenses:    Food Insecurity:     Worried About Running Out of Food in the Last Year:     [address] in the Last Year:    Transportation Needs:     Lack of Transportation (Medical):      Lack of Transportation (Non-Medical):    Physical Activity:     Days of Exercise per Week:     Minutes of Exercise per Session:    Stress:     Feeling of Stress :    Social Connections:     Frequency of Communication with Friends and Family:     Frequency of Social Gatherings with Friends and Family:     Attends L[employer] Services:     Active Member of Clubs or Organizations:     Attends Club or Organization Meetings:     Marital Status:    Intimate Partner Violence:     Fear of Current or Ex-Partner:     Emotionally Abused:     Physically Abused:     Sexually Abused:      Family History:       Problem Relation Age of Onset    Hypertension Paternal Grandfather     Diabetes Paternal Grandfather     Hypertension Paternal [de-identified]     Hypertension Maternal Grandmother     Diabetes Maternal Grandmother     Hypertension Maternal Grandfather     Diabetes Maternal Grandfather     Hypertension Father     Diabetes Father     Hypertension Mother     Seizures Sister     Hypertension Sister     Cancer Sister      Medications Prior to Admission:  Medications Prior to Admission: insulin NPH (HUMULIN N;NOVOLIN N) 100 UNIT/ML injection vial, Inject 60 Units into the skin 2 times daily (before meals) Indications: 60u breakfast 62u bedtime Inject 60 units beneath the skin with breakfast and 62 units at bedtime  cetirizine (ZYRTEC) 10 MG tablet, Take 10 mg by mouth daily  Prenatal Vit-Fe Fumarate-FA (PRENATAL VITAMIN PO), Take 1 tablet by mouth daily  aspirin 81 MG chewable tablet, Take 162 mg by mouth daily   insulin lispro (HUMALOG) 100 UNIT/ML injection vial, Inject 12 Units into the skin 3 times daily (before meals) Indications: quick pen  24u bfst,28u lunch,28u u dinner   Levothyroxine Sodium 137 MCG CAPS, Take 275 mcg by mouth daily     REVIEW OF SYSTEMS:    CONSTITUTIONAL:  negative  RESPIRATORY:  negative  CARDIOVASCULAR:  negative  GASTROINTESTINAL:  negative  ALLERGIC/IMMUNOLOGIC:  negative  NEUROLOGICAL:  negative  BEHAVIOR/PSYCH:  negative    PHYSICAL EXAM:  Vitals:    11/03/21 0909   BP: (!) 193/107   Pulse: 106   Resp: 18   Temp: 98.7 °F (37.1 °C)   TempSrc: Oral   Weight: 269 lb (122 kg)   Height: 5' 2\" (1.575 m)     General appearance:  awake, alert, cooperative, no apparent distress, and appears stated age  Neurologic:  Awake, alert, oriented to name, place and time. Lungs:  No increased work of breathing, good air exchange  Abdomen:  Soft, non tender, gravid, consistent with her gestational age, EFW by Leopald's manouever was 3200   Fetal heart rate:  Reassuring.   Pelvis:  Adequate pelvis    Contraction frequency:  0 minutes    Membranes:  Intact    ASSESSMENT AND PLAN:    Currently with severe range BP   Start procardia for BP control   Start Mg for atleast next 24 hours   Celeston course now   Check PIH labs and collect 24 hour urine protein      If stabilizes and BP mild range then will continue with expectant management    GDMA2   Continue with NPH 62, NPH 60   Continue novolog 24/28/28   Check 4x daily     Will need to adjust dosage with celestone    Hypothyroid     Check TSH, continue with synthroid    LGA   3100g by [facility] on 11/3

## 2021-11-03 NOTE — PROGRESS NOTES
Follow up- diabetes evaluation, denies contractions , bleeding or leaking + fetal movement, Bp 173/100 large cuff upper left arm, recheck normal cuff lower right arm 195/118. Increased swelling in extremities, denies headache vision changes or epigastric discomfort. Will recheck BP after ultrasound exam.  On recheck 179/108, Dr. Birdie Langford given blood sugar log to review and also bp's for today. Will send to triage for evaluation.  Dr. Cortes Cazares notified by Dr. Birdie Langford.

## 2021-11-03 NOTE — PROGRESS NOTES
MATERNAL-FETAL MEDICINE     Follow-up Diabetes evaluation    11/3/2021    Dear Dr. Denia Fletcher,    Thank you for referring Renetta Jade to the [address] [place] [address] at [facility] for follow-up diabetes in pregnancy evaluation. As you know, Renetta Jade is a 27 y.o.  at 32w2d with a naranjo gestation and good obstetric dating. She is being co-managed due to her history of pregestational type 2 DM (Modified White's classification B) along with the following:     Patient Active Problem List   Diagnosis    Pregestational diabetes mellitus, modified White class B    Chronic hypertension affecting pregnancy    Hypothyroid in pregnancy, antepartum, third trimester    Obesity affecting pregnancy in third trimester, antepartum     On presentation today the patient's blood pressure is noted to be severely elevated. She notes a roughly 10 pound in the last week or so and significant increase in lower extremity edema as well as edema of her hands. She denies headache, scotoma, or RUQ pain. As for her diabetes, she states that she is maintaining the prescribed ADA diet. She has been compliant with 7 point FSBS monitoring utilizing both fingerstick checks and a continuous glucose monitor. I reviewed her glycemic log with the findings listed below. She denies any episodes of hypoglycemia. She notes appropriate fetal movement and denies any obstetric complaints including ctx's, loss of fluid, vaginal bleeding, or change in discharge.      MEDICATIONS:     Current Outpatient Medications   Medication Sig Dispense Refill    insulin NPH (HUMULIN N;NOVOLIN N) 100 UNIT/ML injection vial Inject 60 Units into the skin 2 times daily (before meals) Indications: 60u breakfast 62u bedtime Inject 50 units beneath the skin with breakfast and 56 units at bedtime      cetirizine (ZYRTEC) 10 MG tablet Take 10 mg by mouth daily      Prenatal Vit-Fe Fumarate-FA (PRENATAL VITAMIN PO) Take 1 tablet by (Medical):  Lack of Transportation (Non-Medical):    Physical Activity:     Days of Exercise per Week:     Minutes of Exercise per Session:    Stress:     Feeling of Stress :    Social Connections:     Frequency of Communication with Friends and Family:     Frequency of Social Gatherings with Friends and Family:     Attends Jain Services:     Active Member of Clubs or Organizations:     Attends Club or Organization Meetings:     Marital Status:    Intimate Partner Violence:     Fear of Current or Ex-Partner:     Emotionally Abused:     Physically Abused:     Sexually Abused:        OBJECTIVE:   BP (!) 173/100   Pulse 108   Wt 269 lb (122 kg)   LMP 2021   BMI 49.20 kg/m²     GEN: NAD, A+O x 3  RESP: no distress  ABD: soft, obese, NT, gravid with approp FH. FHT visualized on US  EXT: warm and well perfused, 2+ pitting edema to knees    ULTRASOUND FINDINGS: (today)   Live naranjo male fetus in a complete breech presentation. EFW > 99th%. 6lb 15 oz, CHEMA 32.1 cm. Placenta anterior.  Fetal tone, movement, and breathing motions were observed during the exam.     GLYCEMIC LOG: (10/27/21 - 11/3/21)  - Fastin - 117   (6/8 abnormal)  - 1hr PP Bkfst:  122 - 141   (1/7 abnormal)  - Pre Lunch:   90 - 129   (5/7 abnormal)  - 1hr PP Lunch:  99 - 142   (1/7 abnormal)  - Pre Dinner:   76 - 109   (4/7 abnormal)  - 1hr PP Dinner: 87 - 140   (0 abnormal)  - Bedtime:      93 - 155   (4 abnormal)    Recent Results (from the past 1344 hour(s))   RHOGAM INJECTION ONLY    Collection Time: 10/10/21  1:00 PM   Result Value Ref Range    RHIG LOT NUMBER       RhImmuneGlobulin    [id number]          issued       1 vial  10/10/21 13:07       IMPRESSION:   Sergey Michelle is a 27 y.o.  at 33w3d with a naranjo intrauterine gestation and good obstetric dating  1) Class B Pregestational DM (Type 2) with good control based on her glycemic log and HgbA1c, but with fetal macrosomia and polyhydramnios - she is making good meal choices and is very diligent about monitoring  2) CHTN - stable  3) Hypothyroidism - stable with reassuring TFTs  4) Obesity in pregnancy (weight gain is roughly 25 lbs in preg)  5) Gout - stable  6) BREECH presentation      RECOMMENDATIONS:   · The patient was sent directly to triage following this consult for evaluation and management of severe range blood pressures. Dr. Cortes Cazares was provided a verbal report. The patient and I discussed the possible diagnosis of either severe gestatioanl hypertension or preeclampsia and the recommended evaluation and management. The possibility of hospitalization until delivery and  delivery were discussed. · Recommend preeclampsia labs with urine protein creatinine ratio  · Recommend serial blood pressure measurements with prompt treatment of severe range blood pressures  · If severe range blood pressures are confirmed patient will likely require inpatient management until delivery and administration of BTMS. Insulin doses do require adjustment today but doses will be provided once need for BTMS is known as doses are typically increased an additional 20% to accommodate the steroid effect. · The fetus is currently in a BREECH presentation.     If hypertension is not confirmed and the patient resumes outpatient management would continue the following:  · Continue current ADA diet with 7 point FSBS monitoring  · Continue to fax or call in her FSBS values weekly to our office  · She was instructed on the proper goals for pre-prandial values of ~100 gm/dl, fasting values of < 95 gm/dl, and 1 hr post-prandial values < 140 gm/dl  · Serial OB ultrasounds every 4 weeks to follow interval growth   · Serial Hgb A1c measurements every 8 weeks  · Fetal echocardiogram done  · Continue Aspirin for preeclampsia prevention  · Continue current levothyroxine dosage  · Twice daily fetal kick counts from now through delivery  · Initiate twice weekly  testing now and continuing through delivery  · Delivery timing to be determined by maternal and fetal condition. Thank you for allowing me to participate in the care of this patient. Please feel free to contact me for any further questions or concerns.      Miranda Kilpatrick MD    11/3/2021    Total face-to-face time spent: 25 min (pre-visit: 5 min, post-visit: 5 min = total 35 min)

## 2021-11-03 NOTE — FLOWSHEET NOTE
Pt here from [facility] with elevated BP. Dr. Lozano Risk in to evaluate and discuss plan with patient. Labs drawn and sent. Magnesium started with 6 G bolus then 2G/hr. Will transfer to LDR room 5. 24 hour urine started at 1040.

## 2021-11-04 ENCOUNTER — ANESTHESIA (OUTPATIENT)
Dept: LABOR AND DELIVERY | Age: 31
End: 2021-11-04
Payer: COMMERCIAL

## 2021-11-04 ENCOUNTER — ANESTHESIA EVENT (OUTPATIENT)
Dept: LABOR AND DELIVERY | Age: 31
End: 2021-11-04
Payer: COMMERCIAL

## 2021-11-04 LAB
24HR URINE VOLUME (ML): 2800 ML
CREAT SERPL-MCNC: <0.5 MG/DL (ref 0.6–1.2)
CREATININE 24 HOUR URINE: 1.1 G/24HR (ref 0.6–1.5)
CREATININE CLEARANCE: 126 ML/MIN (ref 88–128)
GLUCOSE BLD-MCNC: 121 MG/DL (ref 70–99)
GLUCOSE BLD-MCNC: 133 MG/DL (ref 70–99)
GLUCOSE BLD-MCNC: 163 MG/DL (ref 70–99)
GLUCOSE BLD-MCNC: 168 MG/DL (ref 70–99)
GLUCOSE BLD-MCNC: 176 MG/DL (ref 70–99)
GLUCOSE BLD-MCNC: 177 MG/DL (ref 70–99)
GLUCOSE BLD-MCNC: 193 MG/DL (ref 70–99)
Lab: 24 HR
PERFORMED ON: ABNORMAL
PROTEIN 24 HOUR URINE: 0.36 G/24HR

## 2021-11-04 PROCEDURE — 6360000002 HC RX W HCPCS: Performed by: OBSTETRICS & GYNECOLOGY

## 2021-11-04 PROCEDURE — 2580000003 HC RX 258: Performed by: OBSTETRICS & GYNECOLOGY

## 2021-11-04 PROCEDURE — 84156 ASSAY OF PROTEIN URINE: CPT

## 2021-11-04 PROCEDURE — 6370000000 HC RX 637 (ALT 250 FOR IP): Performed by: OBSTETRICS & GYNECOLOGY

## 2021-11-04 PROCEDURE — 1220000000 HC SEMI PRIVATE OB R&B

## 2021-11-04 RX ORDER — HYDRALAZINE HYDROCHLORIDE 20 MG/ML
5 INJECTION INTRAMUSCULAR; INTRAVENOUS
Status: COMPLETED | OUTPATIENT
Start: 2021-11-04 | End: 2021-11-04

## 2021-11-04 RX ORDER — HYDRALAZINE HYDROCHLORIDE 20 MG/ML
10 INJECTION INTRAMUSCULAR; INTRAVENOUS
Status: COMPLETED | OUTPATIENT
Start: 2021-11-04 | End: 2021-11-04

## 2021-11-04 RX ORDER — INSULIN LISPRO 100 [IU]/ML
4 INJECTION, SOLUTION INTRAVENOUS; SUBCUTANEOUS ONCE
Status: COMPLETED | OUTPATIENT
Start: 2021-11-04 | End: 2021-11-04

## 2021-11-04 RX ORDER — INSULIN LISPRO 100 [IU]/ML
24 INJECTION, SOLUTION INTRAVENOUS; SUBCUTANEOUS
Status: DISCONTINUED | OUTPATIENT
Start: 2021-11-05 | End: 2021-11-12

## 2021-11-04 RX ORDER — INSULIN LISPRO 100 [IU]/ML
28 INJECTION, SOLUTION INTRAVENOUS; SUBCUTANEOUS 2 TIMES DAILY WITH MEALS
Status: DISCONTINUED | OUTPATIENT
Start: 2021-11-05 | End: 2021-11-04

## 2021-11-04 RX ORDER — INSULIN LISPRO 100 [IU]/ML
28 INJECTION, SOLUTION INTRAVENOUS; SUBCUTANEOUS 2 TIMES DAILY WITH MEALS
Status: DISCONTINUED | OUTPATIENT
Start: 2021-11-04 | End: 2021-11-15

## 2021-11-04 RX ADMIN — ACETAMINOPHEN 1000 MG: 500 TABLET, FILM COATED ORAL at 16:43

## 2021-11-04 RX ADMIN — INSULIN LISPRO 28 UNITS: 100 INJECTION, SOLUTION INTRAVENOUS; SUBCUTANEOUS at 18:10

## 2021-11-04 RX ADMIN — LEVOTHYROXINE SODIUM 275 MCG: 0.1 TABLET ORAL at 10:25

## 2021-11-04 RX ADMIN — INSULIN LISPRO 12 UNITS: 100 INJECTION, SOLUTION INTRAVENOUS; SUBCUTANEOUS at 11:25

## 2021-11-04 RX ADMIN — INSULIN LISPRO 4 UNITS: 100 INJECTION, SOLUTION INTRAVENOUS; SUBCUTANEOUS at 15:55

## 2021-11-04 RX ADMIN — NIFEDIPINE 90 MG: 30 TABLET, FILM COATED, EXTENDED RELEASE ORAL at 18:17

## 2021-11-04 RX ADMIN — INSULIN HUMAN 60 UNITS: 100 INJECTION, SUSPENSION SUBCUTANEOUS at 22:12

## 2021-11-04 RX ADMIN — HYDRALAZINE HYDROCHLORIDE 10 MG: 20 INJECTION INTRAMUSCULAR; INTRAVENOUS at 20:30

## 2021-11-04 RX ADMIN — HYDRALAZINE HYDROCHLORIDE 5 MG: 20 INJECTION INTRAMUSCULAR; INTRAVENOUS at 20:05

## 2021-11-04 RX ADMIN — ACETAMINOPHEN 1000 MG: 500 TABLET, FILM COATED ORAL at 06:21

## 2021-11-04 RX ADMIN — BETAMETHASONE SODIUM PHOSPHATE AND BETAMETHASONE ACETATE 12 MG: 3; 3 INJECTION, SUSPENSION INTRA-ARTICULAR; INTRALESIONAL; INTRAMUSCULAR at 13:18

## 2021-11-04 RX ADMIN — INSULIN HUMAN 60 UNITS: 100 INJECTION, SUSPENSION SUBCUTANEOUS at 11:19

## 2021-11-04 ASSESSMENT — PAIN SCALES - GENERAL
PAINLEVEL_OUTOF10: 2
PAINLEVEL_OUTOF10: 2

## 2021-11-04 ASSESSMENT — LIFESTYLE VARIABLES: SMOKING_STATUS: 1

## 2021-11-04 NOTE — FLOWSHEET NOTE
Dr. Alessio Jimenez called and updated on serial elevated blood pressures. Orders received to start hydralazine protocol at this time per MD and continue to monitor.

## 2021-11-04 NOTE — PROGRESS NOTES
Department of Obstetrics and Gynecology   Progress Note    Date of Admission: 11/3/2021  Hospital Day:  Hospital Day: 2      Subjective:    Membranes: None    Pain:  none    Vaginal Drainage:no vaginal discharge    Bleeding: None    Fetal Movement: the patients states that the baby moves as usual        Objective:   Vitals:    21 1615 21 1715 21 1727 21 1815   BP: (!) 144/81 136/80  (!) 159/83   Pulse: 108 103  106   Resp: 16 16  16   Temp:    98.4 °F (36.9 °C)   TempSrc:    Oral   SpO2:  98%  100%   Weight:   269 lb 6.4 oz (122.2 kg)    Height:           Physical Examination: General appearance - alert, well appearing, and in no distress and overweight  Chest - clear to auscultation, no wheezes, rales or rhonchi, symmetric air entry  Heart - normal rate, regular rhythm, normal S1, S2, no murmurs, rubs, clicks or gallops  Abdomen - Gravid, obese  Neurological - alert, oriented, normal speech, no focal findings or movement disorder noted  Extremities - peripheral pulses normal, no pedal edema, no clubbing or cyanosis    Lab Results   Component Value Date    WBC 11.6 (H) 2021    HGB 12.7 2021    HCT 37.9 2021     2021    CHOL 214 (H) 2015    TRIG 612 (H) 2015    HDL 30 (L) 2015    LDLDIRECT 92 2015    ALT 9 (L) 2021    AST 18 2021     (L) 2021    K 3.7 2021     2021    CREATININE <0.5 (L) 2021    CREATININE <0.5 (L) 2021    BUN 6 (L) 2021    CO2 21 2021    TSH 1.34 2021    LABA1C 5.0 2021    LABMICR Not Indicated 2014           ASSESSMENT AND PLAN:  28 yo G1 @ 32w3d  1. Severe preeclampsia - currently on magnesium and Procardia XL 90 mg, cannot take labetalol, will add hydralazine as needed. Received second dose of celestone tonight. Plan delivery at 34 weeks or sooner if clinical condition worsens. Will continue magnesium at this time.   2. Breech presentation - will plan  for delivery  3.  NST reactive

## 2021-11-04 NOTE — ANESTHESIA PRE PROCEDURE
Department of Anesthesiology  Preprocedure Note       Name:  Stacy Lopez   Age:  27 y.o.  :  1990                                          MRN:  [id number]         Date:  2021      Surgeon: * No surgeons listed *    Procedure: * No procedures listed *    Medications prior to admission:   Prior to Admission medications    Medication Sig Start Date End Date Taking?  Authorizing Provider   acetaminophen (TYLENOL) 500 MG tablet Take 1,000 mg by mouth 3 times daily 21  Yes Historical Provider, MD   betamethasone dipropionate (DIPROLENE) 0.05 % ointment Apply topically 2 times daily 21  Yes Historical Provider, MD   Continuous Blood Gluc  (FREESTYLE VERONIQUE 14 DAY READER) DERRCIK by Other route 6 times daily 21  Yes Historical Provider, MD   insulin NPH (HUMULIN N;NOVOLIN N) 100 UNIT/ML injection vial Inject 60 Units into the skin 2 times daily (before meals) Indications: 60u breakfast 62u bedtime Inject 60 units beneath the skin with breakfast and 62 units at bedtime 10/6/21  Yes Calvin Huber MD   cetirizine (ZYRTEC) 10 MG tablet Take 10 mg by mouth daily   Yes Historical Provider, MD   Prenatal Vit-Fe Fumarate-FA (PRENATAL VITAMIN PO) Take 1 tablet by mouth daily   Yes Historical Provider, MD   aspirin 81 MG chewable tablet Take 162 mg by mouth daily    Yes Historical Provider, MD   insulin lispro (HUMALOG) 100 UNIT/ML injection vial Inject 12 Units into the skin 3 times daily (before meals) Indications: quick pen  24u bfst,28u lunch,28u u dinner    Yes Historical Provider, MD   Levothyroxine Sodium 137 MCG CAPS Take 275 mcg by mouth daily    Yes Historical Provider, MD       Current medications:    Current Facility-Administered Medications   Medication Dose Route Frequency Provider Last Rate Last Admin    [START ON 2021] insulin lispro (1 Unit Dial) 24 Units  24 Units SubCUTAneous Daily with breakfast Isak Medina MD        insulin lispro (1 Unit Dial) 28 Units  28 Units SubCUTAneous BID WC Timothy Pisano MD   28 Units at 11/04/21 1810    insulin NPH (HUMULIN N;NOVOLIN N) injection pen 60 Units  60 Units SubCUTAneous BID AC Cuco Cardenas MD   60 Units at 11/04/21 1119    sodium chloride flush 0.9 % injection 5-40 mL  5-40 mL IntraVENous PRN Cuco Cardenas MD        0.9 % sodium chloride infusion  25 mL IntraVENous PRN Cuco Cardenas MD   Stopped at 11/03/21 1139    simethicone (MYLICON) chewable tablet 80 mg  80 mg Oral Q6H PRN Cuco Cardenas MD        [facility]ansetron [facility]) injection 4 mg  4 mg IntraVENous Q6H PRN Cuco Cardenas MD        magnesium sulfate (88143 mg/500mL infusion)  2,000 mg/hr IntraVENous Continuous Cuco Cardenas MD 50 mL/hr at 11/04/21 1558 2,000 mg/hr at 11/04/21 1558    calcium gluconate 10 % injection 1,000 mg  1,000 mg IntraVENous PRN Cuco Cardenas MD        glucose (GLUTOSE) 40 % oral gel 15 g  15 g Oral PRN Cuco Cardenas MD        dextrose 50 % IV solution  12.5 g IntraVENous PRN Cuco Cardenas MD        glucagon (rDNA) injection 1 mg  1 mg IntraMUSCular PRN Cuco Cardenas MD        dextrose 5 % solution  100 mL/hr IntraVENous PRN Cuco Cardenas MD        0.9 % sodium chloride infusion   IntraVENous Continuous Cuco Cardenas MD 75 mL/hr at 11/04/21 1936 New Bag at 11/04/21 1936    levothyroxine (SYNTHROID) tablet 275 mcg  275 mcg Oral Daily Cuco Cardenas MD   275 mcg at 11/04/21 1025    acetaminophen (TYLENOL) tablet 1,000 mg  1,000 mg Oral Q6H PRN Cuco Cardenas MD   1,000 mg at 11/04/21 1643    NIFEdipine (PROCARDIA XL) extended release tablet 90 mg  90 mg Oral Daily Cuco Cardenas MD   90 mg at 11/04/21 1817    hydrOXYzine (VISTARIL) capsule 50 mg  50 mg Oral TID PRN Floresita Dixon MD           Allergies:     Allergies   Allergen Reactions    Cephalexin Hives    Other Hives    Sulfamethoxazole-Trimethoprim Hives    Hydromorphone Photosensitivity     Vision changes, blackened vision.     Phenergan [Promethazine Hcl] Hives    Promethazine Hcl     Morphine Anxiety and Nausea And Vomiting       Problem List:    Patient Active Problem List   Diagnosis Code    Pregestational diabetes mellitus, modified White class B O24.319    Chronic hypertension affecting pregnancy O10.26    Hypothyroid in pregnancy, antepartum, third trimester O99.283, E03.9    Obesity affecting pregnancy in third trimester, antepartum O99.213    Gestational hypertension, third trimester O13.3       Past Medical History:        Diagnosis Date    Diabetes mellitus ([address]     Hypertension     PCOS (polycystic ovarian syndrome)     Purpura allergic     Thyroid disorder     hypothyroid       Past Surgical History:        Procedure Laterality Date    CHOLECYSTECTOMY      EYE SURGERY      cyst removed from right       Social History:    Social History     Tobacco Use    Smoking status: Former Smoker     Packs/day: 1.00     Types: Cigarettes    Smokeless tobacco: Never Used   Substance Use Topics    Alcohol use: Never                                Counseling given: Not Answered      Vital Signs (Current):   Vitals:    11/04/21 1727 11/04/21 1815 11/04/21 1922 11/04/21 1943   BP:  (!) 159/83 (!) 193/92 (!) 185/86   Pulse:  106 110 107   Resp:  16 16    Temp:  36.9 °C (98.4 °F)     TempSrc:  Oral     SpO2:  100%     Weight: 269 lb 6.4 oz (122.2 kg)      Height:                                                  BP Readings from Last 3 Encounters:   11/04/21 (!) 185/86   11/03/21 (!) 173/100   10/10/21 (!) 140/84       NPO Status: Time of last liquid consumption: 0730                        Time of last solid consumption: 0730                        Date of last liquid consumption: 11/03/21                        Date of last solid food consumption: 11/03/21    BMI:   Wt Readings from Last 3 Encounters:   11/04/21 269 lb 6.4 oz (122.2 kg)   11/03/21 269 lb (122 kg) 10/06/21 249 lb 12.8 oz (113.3 kg)     Body mass index is 49.27 kg/m². CBC:   Lab Results   Component Value Date    WBC 11.6 11/03/2021    RBC 4.73 11/03/2021    HGB 12.7 11/03/2021    HCT 37.9 11/03/2021    MCV 80.1 11/03/2021    RDW 14.6 11/03/2021     11/03/2021       CMP:   Lab Results   Component Value Date     11/03/2021    K 3.7 11/03/2021     11/03/2021    CO2 21 11/03/2021    BUN 6 11/03/2021    CREATININE <0.5 11/03/2021    CREATININE <0.5 11/03/2021    GFRAA >60 11/03/2021    AGRATIO 1.5 05/24/2021    LABGLOM >60 11/03/2021    GLUCOSE 207 11/03/2021    PROT 6.5 11/03/2021    CALCIUM 9.3 11/03/2021    BILITOT 0.5 11/03/2021    ALKPHOS 140 11/03/2021    AST 18 11/03/2021    ALT 9 11/03/2021       POC Tests:   Recent Labs     11/04/21  1934   POCGLU 193*       Coags: No results found for: PROTIME, INR, APTT    HCG (If Applicable):   Lab Results   Component Value Date    PREGTESTUR Negative 12/06/2014        ABGs: No results found for: PHART, PO2ART, LYB9QGS, ZDN6BDV, BEART, S0AIVHXN     Type & Screen (If Applicable):  No results found for: LABABO, LABRH    Drug/Infectious Status (If Applicable):  No results found for: HIV, HEPCAB    COVID-19 Screening (If Applicable): No results found for: COVID19        Anesthesia Evaluation  Patient summary reviewed and Nursing notes reviewed   history of anesthetic complications: PONV.   Airway: Mallampati: II  TM distance: >3 FB   Neck ROM: full  Mouth opening: > = 3 FB Dental: normal exam         Pulmonary:normal exam    (+) asthma: current smoker (quit 3 years ago)                           Cardiovascular:    (+) hypertension (on lisinopril previously, came off when she quit smioking, BP increased in pregnancy, on mag ):,                   Neuro/Psych:   (+) depression/anxiety             GI/Hepatic/Renal:   (+) GERD:, morbid obesity          Endo/Other:    (+) Diabetesusing insulin, hypothyroidism (on synthroid 275mg daily, increased half way through pregnancy )::., .                 Abdominal:   (+) obese,           Vascular: negative vascular ROS. Other Findings:             Anesthesia Plan      regional and spinal     ASA 3 - emergent     (Discussed spinal procedure and risks including but not limited to infection, changes in VS, n/v, severe headache, paresthesia, intravascular injection, rare temporary or permanent injury and failed block with GETA back up. Discussed the addition of duramorph to the spinal.  Patient understands the side effects of duramorph and agrees to its utilization.)        Anesthetic plan and risks discussed with patient. DANIEL Barrera - CRNA   11/4/2021        Pt has morphine listed as an allergy, had vomiting and \"black out\" episode after lap choley.  Pt agreeable to duramorph in spinal

## 2021-11-05 PROBLEM — O14.10 HYPERTENSION IN PREGNANCY, PREECLAMPSIA, SEVERE: Status: ACTIVE | Noted: 2021-11-05

## 2021-11-05 LAB
A/G RATIO: 1.1 (ref 1.1–2.2)
ABO/RH: NORMAL
ALBUMIN SERPL-MCNC: 3.2 G/DL (ref 3.4–5)
ALP BLD-CCNC: 130 U/L (ref 40–129)
ALT SERPL-CCNC: 9 U/L (ref 10–40)
ANION GAP SERPL CALCULATED.3IONS-SCNC: 13 MMOL/L (ref 3–16)
ANTIBODY IDENTIFICATION: NORMAL
ANTIBODY SCREEN: NORMAL
AST SERPL-CCNC: 19 U/L (ref 15–37)
BASOPHILS ABSOLUTE: 0 K/UL (ref 0–0.2)
BASOPHILS RELATIVE PERCENT: 0.1 %
BILIRUB SERPL-MCNC: 0.6 MG/DL (ref 0–1)
BUN BLDV-MCNC: 7 MG/DL (ref 7–20)
CALCIUM SERPL-MCNC: 7.8 MG/DL (ref 8.3–10.6)
CHLORIDE BLD-SCNC: 102 MMOL/L (ref 99–110)
CO2: 19 MMOL/L (ref 21–32)
CREAT SERPL-MCNC: <0.5 MG/DL (ref 0.6–1.1)
DAT IGG CAPTURE: NORMAL
EOSINOPHILS ABSOLUTE: 0 K/UL (ref 0–0.6)
EOSINOPHILS RELATIVE PERCENT: 0 %
GFR AFRICAN AMERICAN: >60
GFR NON-AFRICAN AMERICAN: >60
GLUCOSE BLD-MCNC: 141 MG/DL (ref 70–99)
GLUCOSE BLD-MCNC: 143 MG/DL (ref 70–99)
GLUCOSE BLD-MCNC: 149 MG/DL (ref 70–99)
GLUCOSE BLD-MCNC: 164 MG/DL (ref 70–99)
GLUCOSE BLD-MCNC: 187 MG/DL (ref 70–99)
GLUCOSE BLD-MCNC: 188 MG/DL (ref 70–99)
GLUCOSE BLD-MCNC: 91 MG/DL (ref 70–99)
HCT VFR BLD CALC: 36 % (ref 36–48)
HEMOGLOBIN: 11.9 G/DL (ref 12–16)
LACTATE DEHYDROGENASE: 160 U/L (ref 100–190)
LYMPHOCYTES ABSOLUTE: 1.5 K/UL (ref 1–5.1)
LYMPHOCYTES RELATIVE PERCENT: 11.1 %
MCH RBC QN AUTO: 26.8 PG (ref 26–34)
MCHC RBC AUTO-ENTMCNC: 32.9 G/DL (ref 31–36)
MCV RBC AUTO: 81.4 FL (ref 80–100)
MONOCYTES ABSOLUTE: 0.7 K/UL (ref 0–1.3)
MONOCYTES RELATIVE PERCENT: 5.3 %
NEUTROPHILS ABSOLUTE: 11.4 K/UL (ref 1.7–7.7)
NEUTROPHILS RELATIVE PERCENT: 83.5 %
PDW BLD-RTO: 14.8 % (ref 12.4–15.4)
PERFORMED ON: ABNORMAL
PERFORMED ON: NORMAL
PLATELET # BLD: 190 K/UL (ref 135–450)
PMV BLD AUTO: 9.7 FL (ref 5–10.5)
POTASSIUM SERPL-SCNC: 3.4 MMOL/L (ref 3.5–5.1)
RBC # BLD: 4.43 M/UL (ref 4–5.2)
SODIUM BLD-SCNC: 134 MMOL/L (ref 136–145)
TOTAL PROTEIN: 6.2 G/DL (ref 6.4–8.2)
URIC ACID, SERUM: 9.3 MG/DL (ref 2.6–6)
WBC # BLD: 13.7 K/UL (ref 4–11)

## 2021-11-05 PROCEDURE — 6370000000 HC RX 637 (ALT 250 FOR IP): Performed by: OBSTETRICS & GYNECOLOGY

## 2021-11-05 PROCEDURE — 83615 LACTATE (LD) (LDH) ENZYME: CPT

## 2021-11-05 PROCEDURE — 1220000000 HC SEMI PRIVATE OB R&B

## 2021-11-05 PROCEDURE — 86850 RBC ANTIBODY SCREEN: CPT

## 2021-11-05 PROCEDURE — 86780 TREPONEMA PALLIDUM: CPT

## 2021-11-05 PROCEDURE — 86901 BLOOD TYPING SEROLOGIC RH(D): CPT

## 2021-11-05 PROCEDURE — 6360000002 HC RX W HCPCS: Performed by: OBSTETRICS & GYNECOLOGY

## 2021-11-05 PROCEDURE — 2580000003 HC RX 258: Performed by: OBSTETRICS & GYNECOLOGY

## 2021-11-05 PROCEDURE — 86900 BLOOD TYPING SEROLOGIC ABO: CPT

## 2021-11-05 PROCEDURE — 84550 ASSAY OF BLOOD/URIC ACID: CPT

## 2021-11-05 PROCEDURE — 86870 RBC ANTIBODY IDENTIFICATION: CPT

## 2021-11-05 PROCEDURE — 6360000002 HC RX W HCPCS

## 2021-11-05 PROCEDURE — 85025 COMPLETE CBC W/AUTO DIFF WBC: CPT

## 2021-11-05 PROCEDURE — 86880 COOMBS TEST DIRECT: CPT

## 2021-11-05 PROCEDURE — 80053 COMPREHEN METABOLIC PANEL: CPT

## 2021-11-05 RX ORDER — SODIUM CHLORIDE 9 MG/ML
INJECTION, SOLUTION INTRAVENOUS CONTINUOUS
Status: DISCONTINUED | OUTPATIENT
Start: 2021-11-05 | End: 2021-11-15

## 2021-11-05 RX ORDER — HYDRALAZINE HYDROCHLORIDE 20 MG/ML
10 INJECTION INTRAMUSCULAR; INTRAVENOUS
Status: COMPLETED | OUTPATIENT
Start: 2021-11-05 | End: 2021-11-05

## 2021-11-05 RX ORDER — HYDRALAZINE HYDROCHLORIDE 20 MG/ML
INJECTION INTRAMUSCULAR; INTRAVENOUS
Status: COMPLETED
Start: 2021-11-05 | End: 2021-11-05

## 2021-11-05 RX ADMIN — SODIUM CHLORIDE, PRESERVATIVE FREE 10 ML: 5 INJECTION INTRAVENOUS at 22:54

## 2021-11-05 RX ADMIN — LEVOTHYROXINE SODIUM 275 MCG: 0.1 TABLET ORAL at 08:08

## 2021-11-05 RX ADMIN — INSULIN LISPRO 24 UNITS: 100 INJECTION, SOLUTION INTRAVENOUS; SUBCUTANEOUS at 08:37

## 2021-11-05 RX ADMIN — ACETAMINOPHEN 1000 MG: 500 TABLET, FILM COATED ORAL at 02:07

## 2021-11-05 RX ADMIN — ACETAMINOPHEN 1000 MG: 500 TABLET, FILM COATED ORAL at 13:55

## 2021-11-05 RX ADMIN — INSULIN LISPRO 28 UNITS: 100 INJECTION, SOLUTION INTRAVENOUS; SUBCUTANEOUS at 12:18

## 2021-11-05 RX ADMIN — INSULIN LISPRO 28 UNITS: 100 INJECTION, SOLUTION INTRAVENOUS; SUBCUTANEOUS at 17:54

## 2021-11-05 RX ADMIN — INSULIN HUMAN 60 UNITS: 100 INJECTION, SUSPENSION SUBCUTANEOUS at 07:35

## 2021-11-05 RX ADMIN — NIFEDIPINE 90 MG: 30 TABLET, FILM COATED, EXTENDED RELEASE ORAL at 18:03

## 2021-11-05 RX ADMIN — INSULIN HUMAN 60 UNITS: 100 INJECTION, SUSPENSION SUBCUTANEOUS at 22:58

## 2021-11-05 ASSESSMENT — PAIN SCALES - GENERAL
PAINLEVEL_OUTOF10: 0
PAINLEVEL_OUTOF10: 2

## 2021-11-05 NOTE — FLOWSHEET NOTE
Pt. Complaining of being uncomfortable and not being able to sleep. Post partum bed brought in and exchanged at this time. Pt. Satisfied and states she will be able to sleep.

## 2021-11-05 NOTE — FLOWSHEET NOTE
Hydralazine 10mg given at 0938 IV per Jordan Nicole RN. Repeat /86. Repeat PIH labs, type and screen, and RPR obtained and sent to lab. Pt tolerated well. Pt placed in semi-flowers with left hip tilt. Cont EFM noted with loss of fix r/t maternal habitus and polyhydramnios. Pt resting at this time.

## 2021-11-05 NOTE — FLOWSHEET NOTE
notified of patient's labs. Uric acid 9.3. BP improving on side. Pt request for blood sugar before she eats lunch. . Insulin given but holding lunch at this time until  can be at the bedside to assess patient.  Will repeat BS in 1hr.

## 2021-11-05 NOTE — FLOWSHEET NOTE
Repeat BP taken after 5 mg of hydralazine per MD and protocol. /91. Protocol policy followed and 10 mg of hydralazine given at this time. Repeat BP in 20 minutes and MD to be notified of results.

## 2021-11-05 NOTE — PROGRESS NOTES
Department of Obstetrics and Gynecology   Progress Note    Date of Admission: 11/3/2021  Hospital Day:  Hospital Day: 3      Subjective:    Membranes: None    Pain:  none    Vaginal Drainage:no vaginal discharge    Bleeding: None    Fetal Movement: the patients states that the baby moves as usual        Objective:   Vitals:    21 1116 21 1133 21 1153 21 1232   BP: (!) 153/73 (!) 147/76 (!) 142/71 134/72   Pulse: 110 109 111 112   Resp:  18  18   Temp:  98.4 °F (36.9 °C)  97.9 °F (36.6 °C)   TempSrc:  Oral  Oral   SpO2:  97% 97% 98%   Weight:       Height:           Physical Examination: General appearance - alert, well appearing, and in no distress  Chest - clear to auscultation, no wheezes, rales or rhonchi, symmetric air entry  Heart - normal rate, regular rhythm, normal S1, S2, no murmurs, rubs, clicks or gallops  Abdomen - Gravid, Obese, nontender  Extremities - peripheral pulses normal, no pedal edema, no clubbing or cyanosis  Skin - normal coloration and turgor, no rashes, no suspicious skin lesions noted    Lab Results   Component Value Date    WBC 13.7 (H) 2021    HGB 11.9 (L) 2021    HCT 36.0 2021     2021    CHOL 214 (H) 2015    TRIG 612 (H) 2015    HDL 30 (L) 2015    LDLDIRECT 92 2015    ALT 9 (L) 2021    AST 19 2021     (L) 2021    K 3.4 (L) 2021     2021    CREATININE <0.5 (L) 2021    BUN 7 2021    CO2 19 (L) 2021    TSH 1.34 2021    LABA1C 5.0 2021    LABMICR Not Indicated 2014           ASSESSMENT AND PLAN:  26 yo G1 @ 32w4d  1. Chronic HTN with SIP with severe features - BP stable in mild range with medication, good urine output, labs stable, uric acid increased, 24 hour urine at 364  2.  Magnesium sulfate stopped now that the patient is at 48 hours and will continue with expectant management until 34 weeks unless clinical condition indicates earlier delivery  3. Breech presentation, will plan  for delivery  4.  A2 GDM - on regular insulin dosing now, 48 hours from betamethasone

## 2021-11-05 NOTE — FLOWSHEET NOTE
Pt ambulating to and from bathroom without incident. IV capped and pt up to shower.  Pt's  at bedside to assist.

## 2021-11-06 LAB
GLUCOSE BLD-MCNC: 102 MG/DL (ref 70–99)
GLUCOSE BLD-MCNC: 136 MG/DL (ref 70–99)
GLUCOSE BLD-MCNC: 144 MG/DL (ref 70–99)
GLUCOSE BLD-MCNC: 169 MG/DL (ref 70–99)
GLUCOSE BLD-MCNC: 96 MG/DL (ref 70–99)
PERFORMED ON: ABNORMAL
PERFORMED ON: NORMAL
TOTAL SYPHILLIS IGG/IGM: NORMAL

## 2021-11-06 PROCEDURE — 87081 CULTURE SCREEN ONLY: CPT

## 2021-11-06 PROCEDURE — 6370000000 HC RX 637 (ALT 250 FOR IP): Performed by: OBSTETRICS & GYNECOLOGY

## 2021-11-06 PROCEDURE — 1220000000 HC SEMI PRIVATE OB R&B

## 2021-11-06 RX ADMIN — LEVOTHYROXINE SODIUM 275 MCG: 0.1 TABLET ORAL at 08:49

## 2021-11-06 RX ADMIN — NIFEDIPINE 90 MG: 30 TABLET, FILM COATED, EXTENDED RELEASE ORAL at 18:11

## 2021-11-06 RX ADMIN — INSULIN LISPRO 28 UNITS: 100 INJECTION, SOLUTION INTRAVENOUS; SUBCUTANEOUS at 18:42

## 2021-11-06 RX ADMIN — INSULIN HUMAN 60 UNITS: 100 INJECTION, SUSPENSION SUBCUTANEOUS at 22:15

## 2021-11-06 RX ADMIN — INSULIN LISPRO 28 UNITS: 100 INJECTION, SOLUTION INTRAVENOUS; SUBCUTANEOUS at 14:50

## 2021-11-06 RX ADMIN — INSULIN LISPRO 24 UNITS: 100 INJECTION, SOLUTION INTRAVENOUS; SUBCUTANEOUS at 10:08

## 2021-11-06 RX ADMIN — INSULIN HUMAN 60 UNITS: 100 INJECTION, SUSPENSION SUBCUTANEOUS at 10:11

## 2021-11-06 NOTE — PROCEDURES
FETAL SURVEILLANCE TESTING SUMMARY    INDICATIONS:  gestational diabetes mellitus and chronic hypertension    OBJECTIVE RESULTS:  Fetal heart variability: moderate  Fetal Heart Rate decelerations: none  Fetal Heart Rate accelerations: yes  Baseline FHR: 150 per minute  Fetal Non-stress Test: reactive    Fetal surveillance: reassuring

## 2021-11-06 NOTE — PROGRESS NOTES
Department of Obstetrics and Gynecology   Progress Note    Date of Admission: 11/3/2021  Hospital Day:  Hospital Day: 4      Subjective:    No s/s of PIH    Pain:  none    Vaginal Drainage:no vaginal discharge    Bleeding: None    Fetal Movement: the patients states that the baby moves as usual        Objective:   Vitals:    21 1659 21 2219 21 0613 21 1004   BP: 139/82 (!) 147/88 (!) 140/76 (!) 152/78   Pulse: 101 94 84 88   Resp: 16   18   Temp: 99 °F (37.2 °C) 98.9 °F (37.2 °C)  97.5 °F (36.4 °C)   TempSrc: Oral Oral  Oral   SpO2:       Weight:       Height:           Physical Examination: General appearance - alert, well appearing, and in no distress  Extremities - 2+ DTR    Lab Results   Component Value Date    WBC 13.7 (H) 2021    HGB 11.9 (L) 2021    HCT 36.0 2021     2021    CHOL 214 (H) 2015    TRIG 612 (H) 2015    HDL 30 (L) 2015    LDLDIRECT 92 2015    ALT 9 (L) 2021    AST 19 2021     (L) 2021    K 3.4 (L) 2021     2021    CREATININE <0.5 (L) 2021    BUN 7 2021    CO2 19 (L) 2021    TSH 1.34 2021    LABA1C 5.0 2021    LABMICR Not Indicated 2014           ASSESSMENT AND PLAN:  26 yo G1 at 32+5 weeks   1. CHTN with superimposed PIH: continue bedrest in hospital with procardia, plan delivery at 34 weeks if remains stable  2. Breech: for c/s for delivery  3.  GDMA2: continue regular insulin schedule, diabetic diet

## 2021-11-07 LAB
A/G RATIO: 1.1 (ref 1.1–2.2)
ABO/RH: NORMAL
ALBUMIN SERPL-MCNC: 3.4 G/DL (ref 3.4–5)
ALP BLD-CCNC: 135 U/L (ref 40–129)
ALT SERPL-CCNC: 11 U/L (ref 10–40)
ANION GAP SERPL CALCULATED.3IONS-SCNC: 14 MMOL/L (ref 3–16)
ANTIBODY IDENTIFICATION: NORMAL
ANTIBODY SCREEN: NORMAL
AST SERPL-CCNC: 23 U/L (ref 15–37)
BASOPHILS ABSOLUTE: 0 K/UL (ref 0–0.2)
BASOPHILS RELATIVE PERCENT: 0.2 %
BILIRUB SERPL-MCNC: 0.6 MG/DL (ref 0–1)
BUN BLDV-MCNC: 9 MG/DL (ref 7–20)
CALCIUM SERPL-MCNC: 9 MG/DL (ref 8.3–10.6)
CHLORIDE BLD-SCNC: 101 MMOL/L (ref 99–110)
CO2: 22 MMOL/L (ref 21–32)
CREAT SERPL-MCNC: <0.5 MG/DL (ref 0.6–1.1)
DAT IGG CAPTURE: NORMAL
EOSINOPHILS ABSOLUTE: 0.1 K/UL (ref 0–0.6)
EOSINOPHILS RELATIVE PERCENT: 0.7 %
GFR AFRICAN AMERICAN: >60
GFR NON-AFRICAN AMERICAN: >60
GLUCOSE BLD-MCNC: 104 MG/DL (ref 70–99)
GLUCOSE BLD-MCNC: 134 MG/DL (ref 70–99)
GLUCOSE BLD-MCNC: 139 MG/DL (ref 70–99)
GLUCOSE BLD-MCNC: 168 MG/DL (ref 70–99)
GLUCOSE BLD-MCNC: 90 MG/DL (ref 70–99)
HCT VFR BLD CALC: 38.9 % (ref 36–48)
HEMOGLOBIN: 12.8 G/DL (ref 12–16)
LYMPHOCYTES ABSOLUTE: 1.5 K/UL (ref 1–5.1)
LYMPHOCYTES RELATIVE PERCENT: 12.2 %
MCH RBC QN AUTO: 26.5 PG (ref 26–34)
MCHC RBC AUTO-ENTMCNC: 32.9 G/DL (ref 31–36)
MCV RBC AUTO: 80.5 FL (ref 80–100)
MONOCYTES ABSOLUTE: 0.6 K/UL (ref 0–1.3)
MONOCYTES RELATIVE PERCENT: 5.1 %
NEUTROPHILS ABSOLUTE: 9.9 K/UL (ref 1.7–7.7)
NEUTROPHILS RELATIVE PERCENT: 81.8 %
PDW BLD-RTO: 14.8 % (ref 12.4–15.4)
PERFORMED ON: ABNORMAL
PERFORMED ON: NORMAL
PLATELET # BLD: 163 K/UL (ref 135–450)
PMV BLD AUTO: 9.6 FL (ref 5–10.5)
POTASSIUM SERPL-SCNC: 3.6 MMOL/L (ref 3.5–5.1)
RBC # BLD: 4.82 M/UL (ref 4–5.2)
SODIUM BLD-SCNC: 137 MMOL/L (ref 136–145)
TOTAL PROTEIN: 6.5 G/DL (ref 6.4–8.2)
URIC ACID, SERUM: 8.5 MG/DL (ref 2.6–6)
WBC # BLD: 12.2 K/UL (ref 4–11)

## 2021-11-07 PROCEDURE — 86900 BLOOD TYPING SEROLOGIC ABO: CPT

## 2021-11-07 PROCEDURE — 86901 BLOOD TYPING SEROLOGIC RH(D): CPT

## 2021-11-07 PROCEDURE — 6370000000 HC RX 637 (ALT 250 FOR IP): Performed by: OBSTETRICS & GYNECOLOGY

## 2021-11-07 PROCEDURE — 85025 COMPLETE CBC W/AUTO DIFF WBC: CPT

## 2021-11-07 PROCEDURE — 84550 ASSAY OF BLOOD/URIC ACID: CPT

## 2021-11-07 PROCEDURE — 6360000002 HC RX W HCPCS

## 2021-11-07 PROCEDURE — 86880 COOMBS TEST DIRECT: CPT

## 2021-11-07 PROCEDURE — 86870 RBC ANTIBODY IDENTIFICATION: CPT

## 2021-11-07 PROCEDURE — 80053 COMPREHEN METABOLIC PANEL: CPT

## 2021-11-07 PROCEDURE — 1220000000 HC SEMI PRIVATE OB R&B

## 2021-11-07 PROCEDURE — 86850 RBC ANTIBODY SCREEN: CPT

## 2021-11-07 RX ORDER — HYDRALAZINE HYDROCHLORIDE 20 MG/ML
INJECTION INTRAMUSCULAR; INTRAVENOUS
Status: COMPLETED
Start: 2021-11-07 | End: 2021-11-07

## 2021-11-07 RX ORDER — HYDRALAZINE HYDROCHLORIDE 20 MG/ML
5 INJECTION INTRAMUSCULAR; INTRAVENOUS ONCE
Status: COMPLETED | OUTPATIENT
Start: 2021-11-07 | End: 2021-11-07

## 2021-11-07 RX ORDER — HYDRALAZINE HYDROCHLORIDE 20 MG/ML
10 INJECTION INTRAMUSCULAR; INTRAVENOUS ONCE
Status: COMPLETED | OUTPATIENT
Start: 2021-11-07 | End: 2021-11-07

## 2021-11-07 RX ADMIN — HYDRALAZINE HYDROCHLORIDE 5 MG: 20 INJECTION INTRAMUSCULAR; INTRAVENOUS at 11:42

## 2021-11-07 RX ADMIN — INSULIN LISPRO 28 UNITS: 100 INJECTION, SOLUTION INTRAVENOUS; SUBCUTANEOUS at 17:34

## 2021-11-07 RX ADMIN — HYDRALAZINE HYDROCHLORIDE 10 MG: 20 INJECTION INTRAMUSCULAR; INTRAVENOUS at 23:16

## 2021-11-07 RX ADMIN — HYDROXYZINE PAMOATE 50 MG: 25 CAPSULE ORAL at 21:06

## 2021-11-07 RX ADMIN — INSULIN LISPRO 28 UNITS: 100 INJECTION, SOLUTION INTRAVENOUS; SUBCUTANEOUS at 14:33

## 2021-11-07 RX ADMIN — INSULIN HUMAN 60 UNITS: 100 INJECTION, SUSPENSION SUBCUTANEOUS at 09:32

## 2021-11-07 RX ADMIN — INSULIN HUMAN 60 UNITS: 100 INJECTION, SUSPENSION SUBCUTANEOUS at 22:09

## 2021-11-07 RX ADMIN — LEVOTHYROXINE SODIUM 275 MCG: 0.1 TABLET ORAL at 07:35

## 2021-11-07 RX ADMIN — HYDRALAZINE HYDROCHLORIDE 5 MG: 20 INJECTION INTRAMUSCULAR; INTRAVENOUS at 20:57

## 2021-11-07 RX ADMIN — NIFEDIPINE 90 MG: 30 TABLET, FILM COATED, EXTENDED RELEASE ORAL at 18:06

## 2021-11-07 RX ADMIN — INSULIN LISPRO 24 UNITS: 100 INJECTION, SOLUTION INTRAVENOUS; SUBCUTANEOUS at 09:32

## 2021-11-07 NOTE — PROGRESS NOTES
Department of Obstetrics and Gynecology   Progress Note    Date of Admission: 11/3/2021  Hospital Day:  Hospital Day: 5      Subjective:    No complaints. Sitting up to have breakfst, denies HA or worsening edema    Objective:   Vitals:    21 2249 21 0703 21 0742 21 0811   BP: (!) 158/89 (!) 149/83     Pulse: 92 83     Resp:    18   Temp:       TempSrc:       SpO2:       Weight:   269 lb 3.2 oz (122.1 kg)    Height:           Physical Examination: General appearance - alert, well appearing, and in no distress  LE: 2+    Lab Results   Component Value Date    WBC 13.7 (H) 2021    HGB 11.9 (L) 2021    HCT 36.0 2021     2021    CHOL 214 (H) 2015    TRIG 612 (H) 2015    HDL 30 (L) 2015    LDLDIRECT 92 2015    ALT 9 (L) 2021    AST 19 2021     (L) 2021    K 3.4 (L) 2021     2021    CREATININE <0.5 (L) 2021    BUN 7 2021    CO2 19 (L) 2021    TSH 1.34 2021    LABA1C 5.0 2021    LABMICR Not Indicated 2014           ASSESSMENT AND PLAN:  34yo g1 at 32+6wks  Chtn with Superimposed PIH.  Continue hospital bedrest with procardia XL 90mg qd, delivery at 34wks if remains stable  Breech: CS for delivery  GMDA2: continue insulin, diabetic diet

## 2021-11-07 NOTE — FLOWSHEET NOTE
Dr. Heidy Centeno notified of pt glucose level. Pt states had \"baby shower\" today-and she drank soda and had a few bites of food she doesn't normally eat.  Will continue with current POC, will recheck glucose level in AM.

## 2021-11-07 NOTE — PROCEDURES
FETAL SURVEILLANCE TESTING SUMMARY    INDICATIONS:  pregnancy-induced hypertension    OBJECTIVE RESULTS:  Fetal heart variability: moderate    Fetal surveillance: reassuring

## 2021-11-08 LAB
A/G RATIO: 0.9 (ref 1.1–2.2)
ALBUMIN SERPL-MCNC: 3.1 G/DL (ref 3.4–5)
ALP BLD-CCNC: 137 U/L (ref 40–129)
ALT SERPL-CCNC: 11 U/L (ref 10–40)
ANION GAP SERPL CALCULATED.3IONS-SCNC: 10 MMOL/L (ref 3–16)
AST SERPL-CCNC: 20 U/L (ref 15–37)
BASOPHILS ABSOLUTE: 0 K/UL (ref 0–0.2)
BASOPHILS RELATIVE PERCENT: 0.3 %
BILIRUB SERPL-MCNC: 0.6 MG/DL (ref 0–1)
BUN BLDV-MCNC: 8 MG/DL (ref 7–20)
CALCIUM SERPL-MCNC: 8.6 MG/DL (ref 8.3–10.6)
CHLORIDE BLD-SCNC: 100 MMOL/L (ref 99–110)
CO2: 22 MMOL/L (ref 21–32)
CREAT SERPL-MCNC: <0.5 MG/DL (ref 0.6–1.1)
CREATININE URINE: 55.3 MG/DL (ref 28–259)
EOSINOPHILS ABSOLUTE: 0.1 K/UL (ref 0–0.6)
EOSINOPHILS RELATIVE PERCENT: 0.9 %
GFR AFRICAN AMERICAN: >60
GFR NON-AFRICAN AMERICAN: >60
GLUCOSE BLD-MCNC: 104 MG/DL (ref 70–99)
GLUCOSE BLD-MCNC: 121 MG/DL (ref 70–99)
GLUCOSE BLD-MCNC: 127 MG/DL (ref 70–99)
GLUCOSE BLD-MCNC: 88 MG/DL (ref 70–99)
GLUCOSE BLD-MCNC: 95 MG/DL (ref 70–99)
HCT VFR BLD CALC: 39.1 % (ref 36–48)
HEMOGLOBIN: 13.1 G/DL (ref 12–16)
LYMPHOCYTES ABSOLUTE: 1.9 K/UL (ref 1–5.1)
LYMPHOCYTES RELATIVE PERCENT: 14.9 %
MCH RBC QN AUTO: 26.8 PG (ref 26–34)
MCHC RBC AUTO-ENTMCNC: 33.4 G/DL (ref 31–36)
MCV RBC AUTO: 80.3 FL (ref 80–100)
MONOCYTES ABSOLUTE: 0.8 K/UL (ref 0–1.3)
MONOCYTES RELATIVE PERCENT: 6.2 %
NEUTROPHILS ABSOLUTE: 10.1 K/UL (ref 1.7–7.7)
NEUTROPHILS RELATIVE PERCENT: 77.7 %
PDW BLD-RTO: 14.7 % (ref 12.4–15.4)
PERFORMED ON: ABNORMAL
PERFORMED ON: NORMAL
PLATELET # BLD: 171 K/UL (ref 135–450)
PMV BLD AUTO: 9.7 FL (ref 5–10.5)
POTASSIUM SERPL-SCNC: 3.7 MMOL/L (ref 3.5–5.1)
PROTEIN PROTEIN: 18 MG/DL
PROTEIN/CREAT RATIO: 0.3 MG/DL
RBC # BLD: 4.86 M/UL (ref 4–5.2)
SODIUM BLD-SCNC: 132 MMOL/L (ref 136–145)
TOTAL PROTEIN: 6.4 G/DL (ref 6.4–8.2)
URIC ACID, SERUM: 7.6 MG/DL (ref 2.6–6)
WBC # BLD: 13 K/UL (ref 4–11)

## 2021-11-08 PROCEDURE — 6370000000 HC RX 637 (ALT 250 FOR IP): Performed by: OBSTETRICS & GYNECOLOGY

## 2021-11-08 PROCEDURE — 2580000003 HC RX 258: Performed by: OBSTETRICS & GYNECOLOGY

## 2021-11-08 PROCEDURE — 80053 COMPREHEN METABOLIC PANEL: CPT

## 2021-11-08 PROCEDURE — 59025 FETAL NON-STRESS TEST: CPT

## 2021-11-08 PROCEDURE — 82570 ASSAY OF URINE CREATININE: CPT

## 2021-11-08 PROCEDURE — 84550 ASSAY OF BLOOD/URIC ACID: CPT

## 2021-11-08 PROCEDURE — 84156 ASSAY OF PROTEIN URINE: CPT

## 2021-11-08 PROCEDURE — 85025 COMPLETE CBC W/AUTO DIFF WBC: CPT

## 2021-11-08 PROCEDURE — 1220000000 HC SEMI PRIVATE OB R&B

## 2021-11-08 RX ORDER — METHYLDOPA 250 MG/1
500 TABLET, FILM COATED ORAL EVERY 8 HOURS SCHEDULED
Status: DISCONTINUED | OUTPATIENT
Start: 2021-11-08 | End: 2021-11-08 | Stop reason: RX

## 2021-11-08 RX ADMIN — NIFEDIPINE 90 MG: 30 TABLET, FILM COATED, EXTENDED RELEASE ORAL at 17:45

## 2021-11-08 RX ADMIN — INSULIN LISPRO 28 UNITS: 100 INJECTION, SOLUTION INTRAVENOUS; SUBCUTANEOUS at 14:52

## 2021-11-08 RX ADMIN — INSULIN HUMAN 60 UNITS: 100 INJECTION, SUSPENSION SUBCUTANEOUS at 10:45

## 2021-11-08 RX ADMIN — SODIUM CHLORIDE, PRESERVATIVE FREE 10 ML: 5 INJECTION INTRAVENOUS at 08:41

## 2021-11-08 RX ADMIN — ACETAMINOPHEN 1000 MG: 500 TABLET, FILM COATED ORAL at 08:29

## 2021-11-08 RX ADMIN — LEVOTHYROXINE SODIUM 275 MCG: 0.1 TABLET ORAL at 08:39

## 2021-11-08 RX ADMIN — INSULIN HUMAN 60 UNITS: 100 INJECTION, SUSPENSION SUBCUTANEOUS at 22:01

## 2021-11-08 RX ADMIN — INSULIN LISPRO 24 UNITS: 100 INJECTION, SOLUTION INTRAVENOUS; SUBCUTANEOUS at 10:47

## 2021-11-08 RX ADMIN — INSULIN LISPRO 28 UNITS: 100 INJECTION, SOLUTION INTRAVENOUS; SUBCUTANEOUS at 17:46

## 2021-11-08 RX ADMIN — SODIUM CHLORIDE, PRESERVATIVE FREE 10 ML: 5 INJECTION INTRAVENOUS at 23:31

## 2021-11-08 ASSESSMENT — PAIN SCALES - GENERAL: PAINLEVEL_OUTOF10: 6

## 2021-11-08 ASSESSMENT — PAIN DESCRIPTION - DESCRIPTORS: DESCRIPTORS: HEADACHE

## 2021-11-08 NOTE — PROGRESS NOTES
Department of Obstetrics and Gynecology   Progress Note    Date of Admission: 11/3/2021  Hospital Day:  Hospital Day: 6      Subjective: Woke up with severe frontal headache which has resolved with Tylenol. Denies vision changes, abdominal pain. + good fetal movements . LE edems is less per patient. Objective:   Vitals:    21 0445 21 0501 21 0515 21 0825   BP: (!) 166/98 (!) 144/84 (!) 155/85 (!) 165/82   Pulse: 95 90 89 105   Resp:    Temp: 98.3 °F (36.8 °C)      TempSrc: Oral      SpO2:       Weight:       Height:           Physical Examination: General appearance - alert, well appearing, and in no distress  Mental status - alert, oriented to person, place, and time  Abdomen - gravid uterus, no epigastric tenderness  Neurological - alert, oriented, normal speech, no focal findings or movement disorder noted, DTR 2+ , no clonus  Extremities - pedal edema 2 +    Lab Results   Component Value Date    WBC 13.0 (H) 2021    HGB 13.1 2021    HCT 39.1 2021     2021    CHOL 214 (H) 2015    TRIG 612 (H) 2015    HDL 30 (L) 2015    LDLDIRECT 92 2015    ALT 11 2021    AST 20 2021     (L) 2021    K 3.7 2021     2021    CREATININE <0.5 (L) 2021    BUN 8 2021    CO2 22 2021    TSH 1.34 2021    LABA1C 5.0 2021    LABMICR Not Indicated 2014           ASSESSMENT AND PLAN:  33w0d IUP  S/P celestone 11/3 and   1. Chronic hypertension with superimposed preeclampsia: BP in severe range but now controlled with nifedipine 90 mg qd and required 2 doses IV hydralazine. H/O reaction to labetolol so will avoid. 2. Class B DM: BG controlled on insulin: humalog  and NPH 60/60   3. Breech presentation per last USD. Plan CS if persists. 4. Morbid obesity  5.  LGA EFW > 99%

## 2021-11-08 NOTE — FLOWSHEET NOTE
Spoke with Dr. Rock Sharma regarding increased BP. Patient without complaint. Order received for hydralazine 5mg IV x1.

## 2021-11-08 NOTE — PROCEDURES
FETAL SURVEILLANCE TESTING SUMMARY    INDICATIONS:  Preeclampsia; class B DM, 33 weeks    OBJECTIVE RESULTS:  Fetal heart variability: moderate  Fetal Heart Rate decelerations: none  Fetal Heart Rate accelerations: yes  Baseline FHR: 145 per minute  Fetal Non-stress Test: reactive    Fetal surveillance: reassuring

## 2021-11-09 ENCOUNTER — APPOINTMENT (OUTPATIENT)
Dept: ULTRASOUND IMAGING | Age: 31
End: 2021-11-09
Payer: COMMERCIAL

## 2021-11-09 LAB
GLUCOSE BLD-MCNC: 108 MG/DL (ref 70–99)
GLUCOSE BLD-MCNC: 112 MG/DL (ref 70–99)
GLUCOSE BLD-MCNC: 112 MG/DL (ref 70–99)
GLUCOSE BLD-MCNC: 136 MG/DL (ref 70–99)
GLUCOSE BLD-MCNC: 85 MG/DL (ref 70–99)
PERFORMED ON: ABNORMAL
PERFORMED ON: NORMAL
T4 FREE: 1.1 NG/DL (ref 0.9–1.8)
TSH REFLEX: 6.82 UIU/ML (ref 0.27–4.2)

## 2021-11-09 PROCEDURE — 59025 FETAL NON-STRESS TEST: CPT

## 2021-11-09 PROCEDURE — 84439 ASSAY OF FREE THYROXINE: CPT

## 2021-11-09 PROCEDURE — 76819 FETAL BIOPHYS PROFIL W/O NST: CPT

## 2021-11-09 PROCEDURE — 6370000000 HC RX 637 (ALT 250 FOR IP): Performed by: OBSTETRICS & GYNECOLOGY

## 2021-11-09 PROCEDURE — 1220000000 HC SEMI PRIVATE OB R&B

## 2021-11-09 PROCEDURE — 83036 HEMOGLOBIN GLYCOSYLATED A1C: CPT

## 2021-11-09 PROCEDURE — 84443 ASSAY THYROID STIM HORMONE: CPT

## 2021-11-09 PROCEDURE — 2580000003 HC RX 258: Performed by: OBSTETRICS & GYNECOLOGY

## 2021-11-09 RX ORDER — NIFEDIPINE 30 MG/1
30 TABLET, EXTENDED RELEASE ORAL DAILY
Status: DISCONTINUED | OUTPATIENT
Start: 2021-11-09 | End: 2021-11-17

## 2021-11-09 RX ORDER — LEVOTHYROXINE SODIUM 0.1 MG/1
300 TABLET ORAL DAILY
Status: DISCONTINUED | OUTPATIENT
Start: 2021-11-10 | End: 2021-11-18 | Stop reason: HOSPADM

## 2021-11-09 RX ORDER — HYDRALAZINE HYDROCHLORIDE 20 MG/ML
10 INJECTION INTRAMUSCULAR; INTRAVENOUS
Status: ACTIVE | OUTPATIENT
Start: 2021-11-09 | End: 2021-11-09

## 2021-11-09 RX ORDER — HYDRALAZINE HYDROCHLORIDE 20 MG/ML
5 INJECTION INTRAMUSCULAR; INTRAVENOUS
Status: ACTIVE | OUTPATIENT
Start: 2021-11-09 | End: 2021-11-09

## 2021-11-09 RX ADMIN — INSULIN HUMAN 60 UNITS: 100 INJECTION, SUSPENSION SUBCUTANEOUS at 22:16

## 2021-11-09 RX ADMIN — NIFEDIPINE 90 MG: 30 TABLET, FILM COATED, EXTENDED RELEASE ORAL at 17:54

## 2021-11-09 RX ADMIN — INSULIN HUMAN 60 UNITS: 100 INJECTION, SUSPENSION SUBCUTANEOUS at 10:05

## 2021-11-09 RX ADMIN — INSULIN LISPRO 24 UNITS: 100 INJECTION, SOLUTION INTRAVENOUS; SUBCUTANEOUS at 10:03

## 2021-11-09 RX ADMIN — SODIUM CHLORIDE, PRESERVATIVE FREE 10 ML: 5 INJECTION INTRAVENOUS at 10:09

## 2021-11-09 RX ADMIN — NIFEDIPINE 30 MG: 30 TABLET, FILM COATED, EXTENDED RELEASE ORAL at 10:37

## 2021-11-09 RX ADMIN — INSULIN LISPRO 28 UNITS: 100 INJECTION, SOLUTION INTRAVENOUS; SUBCUTANEOUS at 13:11

## 2021-11-09 RX ADMIN — INSULIN LISPRO 28 UNITS: 100 INJECTION, SOLUTION INTRAVENOUS; SUBCUTANEOUS at 17:32

## 2021-11-09 RX ADMIN — LEVOTHYROXINE SODIUM 275 MCG: 0.1 TABLET ORAL at 08:25

## 2021-11-09 NOTE — FLOWSHEET NOTE
Pt to SCN per wheelchair for tour/questions with Pediatrician Dr. Nasrin Bañuelos. Pt met SCN/RT team and questions answered regarding basic SCN equiptment & concerns for 34week gestation. Pt and SO satisfied with tour. Encouraged to right down any questions she may have prior to delivery.   2x snoodles given to patient and instructed on infant use in SCN after delivery

## 2021-11-09 NOTE — PROCEDURES
FETAL SURVEILLANCE TESTING SUMMARY    INDICATIONS:  chronic hypertension with SIP    OBJECTIVE RESULTS:  Fetal heart variability: moderate  Fetal Heart Rate accelerations: yes  Baseline FHR: 150 per minute  Fetal Non-stress Test: reactive    Fetal surveillance: reassuring

## 2021-11-09 NOTE — FLOWSHEET NOTE
Pt to ultrasound from [id number]. CHANELLE Schmidt with patient entire time. Pt now back in room and on fetal monitor.

## 2021-11-09 NOTE — PROGRESS NOTES
Department of Obstetrics and Gynecology   Progress Note     Date of Admission: 11/3/2021  Hospital Day:             Hospital Day:7        Subjective:   Patient states she is feeling well- denies HA, vision changes, chest pain/SOB, abdominal pain. Reports good fetal movements .               Objective:   Vitals:    21 2159 21 2324 21 0302 21 0622   BP: (!) 160/82 (!) 141/80 (!) 157/83 (!) 155/75   Pulse: 100 98  90   Resp: 18 18 16 16   Temp:   97.9 °F (36.6 °C)    TempSrc:   Oral    SpO2:       Weight:       Height:          Physical Examination: General appearance - alert, well appearing, and in no distress  Mental status - alert, oriented to person, place, and time  Lungs: CTA  Cor: RRR  Abdomen - gravid uterus, no epigastric tenderness  Neurological - alert, oriented, normal speech   DTR 2+ , no clonus  Extremities - pedal edema 1 +      Labs:   Lab Results   Component Value Date    WBC 13.0 (H) 2021    HGB 13.1 2021    HCT 39.1 2021    MCV 80.3 2021     2021     Lab Results   Component Value Date     (L) 2021    K 3.7 2021     2021    CO2 22 2021    BUN 8 2021    CREATININE <0.5 (L) 2021    GLUCOSE 95 2021    CALCIUM 8.6 2021    PROT 6.4 2021    LABALBU 3.1 (L) 2021    BILITOT 0.6 2021    ALKPHOS 137 (H) 2021    AST 20 2021    ALT 11 2021    LABGLOM >60 2021    GFRAA >60 2021    AGRATIO 0.9 (L) 2021    GLOB 2.8 2021             ASSESSMENT AND PLAN:  33w1d IUP  S/P celestone 11/3 and   1. Chronic hypertension with superimposed preeclampsia: BP in severe range but moderately controlled with nifedipine 90 mg qd. Aldomet is on back order. Will add Procardia 30 mg XL am. Asymptomatic today. Had allergic reaction to Labetalol. Delivery at 34 weeks or sooner for maternal/fetal indications.   2. Class B DM: BS controlled on insulin: humalog 24/28/28 and NPH 60/60. Will check Hb A1c  3. Morbid obesity   4. Hypothyroidism- on Synthroid 275 mcg, will check TSH. 5. Breech presentation per USD. Plan CS if persists.    6. Fetus:  LGA EFW > 99%, s/p BTMS 11/3- 11/4; BPP twice a week

## 2021-11-09 NOTE — PROGRESS NOTES
NCA was requested to consult  An  patient with a  pregnancy  By Dr Dior Wallace  Patient is  In patient status and being monitored by the [facility] team  Anticipated delivery is at 34 completed weeks on Monday  11/15/2021 or sooner based on clinical progression    Patient presents with a chief complaint as above and is being admitted for hypertension  Patient seen in [facility] office and noted to have elevated BP, severe range. Currently with no ha/vision changes/RUQ pain.   Has had good fetal movement and no contractions  Estimated Due Date: Estimated Date of Delivery: 21     PRENATAL CARE:     Complicated by: Obesity  GDMA2-- NPH 60u am and 62uPM,  Novolog 24u breakfast, 28u lunch, 28u dinner  Hypothyroid  Hx hypertension-- given labetalol at 20 weeks with subsequent raynauds reactions, use procardia        for BP control  Breech presentation  LGA 3100g (11/3)     PAST OB HISTORY:           OB History         1    Para        Term                AB        Living            SAB        TAB        Ectopic        Molar        Multiple        Live Births                       Past Medical History:    Past Medical History        Diagnosis Date    Diabetes mellitus ([address]      Hypertension      PCOS (polycystic ovarian syndrome)      Purpura allergic      Thyroid disorder       hypothyroid         Past Surgical History:    Past Surgical History             Procedure Laterality Date    CHOLECYSTECTOMY        EYE SURGERY         cyst removed from right         Allergies:  Cephalexin, Other, Sulfamethoxazole-trimethoprim, Hydromorphone, Phenergan [promethazine hcl], Promethazine hcl, and Morphine     Social History:    Social History               Socioeconomic History    Marital status:        Spouse name: Not on file    Number of children: Not on file    Years of education: Not on file    Highest education level: Not on file   Occupational History    Not on file   Tobacco Use  Smoking status: Former Smoker       Packs/day: 1.00       Types: Cigarettes    Smokeless tobacco: Never Used   Vaping Use    Vaping Use: Former   Substance and Sexual Activity    Alcohol use: Never    Drug use: Never    Sexual activity: Not on file   Other Topics Concern    Not on file   Social History Narrative    Not on file      Social Determinants of Health          Financial Resource Strain:     Difficulty of Paying Living Expenses:    Food Insecurity:     Worried About Running Out of Food in the Last Year:     [address] in the Last Year:    Transportation Needs:     Lack of Transportation (Medical):      Lack of Transportation (Non-Medical):    Physical Activity:     Days of Exercise per Week:     Minutes of Exercise per Session:    Stress:     Feeling of Stress :    Social Connections:     Frequency of Communication with Friends and Family:     Frequency of Social Gatherings with Friends and Family:     Attends [employer] Services:     Active Member of Clubs or Organizations:     Attends Club or Organization Meetings:     Marital Status:    Intimate Partner Violence:     Fear of Current or Ex-Partner:     Emotionally Abused:     Physically Abused:     Sexually Abused:          Family History:   Family History             Problem Relation Age of Onset    Hypertension Paternal Grandfather      Diabetes Paternal Grandfather      Hypertension Paternal Grandmother      Hypertension Maternal Grandmother      Diabetes Maternal Grandmother      Hypertension Maternal Grandfather      Diabetes Maternal Grandfather      Hypertension Father      Diabetes Father      Hypertension Mother      Seizures Sister      Hypertension Sister      Cancer Sister           Medications Prior to Admission:    Prescriptions Prior to Admission   Medications Prior to Admission: insulin NPH (HUMULIN N;NOVOLIN N) 100 UNIT/ML injection vial, Inject 60 Units into the skin 2 times daily (before meals) Indications: 60u breakfast 62u bedtime Inject 60 units beneath the skin with breakfast and 62 units at bedtime  cetirizine (ZYRTEC) 10 MG tablet, Take 10 mg by mouth daily  Prenatal Vit-Fe Fumarate-FA (PRENATAL VITAMIN PO), Take 1 tablet by mouth daily  aspirin 81 MG chewable tablet, Take 162 mg by mouth daily   insulin lispro (HUMALOG) 100 UNIT/ML injection vial, Inject 12 Units into the skin 3 times daily (before meals) Indications: quick pen  24u bfst,28u lunch,28u u dinner   Levothyroxine Sodium 137 MCG CAPS, Take 275 mcg by mouth daily         REVIEW OF SYSTEMS:     CONSTITUTIONAL:  negative  RESPIRATORY:  negative  CARDIOVASCULAR:  negative  GASTROINTESTINAL:  negative  ALLERGIC/IMMUNOLOGIC:  negative  NEUROLOGICAL:  negative  BEHAVIOR/PSYCH:  negative       I met mom and dad in the antepartum at [facility] . We went over the following topics    Baby 'Warren\" will be delivered on 10/15/21 at 29 weeks gestation  NCA MD will attend delivery at any point and will help with resuscitation and stabilization of baby boy Thoe. Once stable baby will be moved to NICU  FEN : Initially may need IV fluids through PIV or UVC lines   And we will get mom pumping and work on po feeds and wean IVF. Will monitor and maintain euglycemic state  Resp : Warren may need oxygen support including but not limited to NC O2, CPAP , if fio2 needs are > 40 % and unable to wean may need to be transferred to a level III NICU for potential ventilatory management via transport team of [facility]. Mom got 2 doses of steroids on 11/3 and 11/4/21  ID: will monitor Warren closely for signs and symptoms of infection and treat as appropriate   Heme: Carole Babinski  Is at risk of jaundice and may need to be treated by phototherapy based on progression of jaundice  Carole Babinski will have to be  At least 35 weeks , on Room Air , all PO ad ekta and gaining weight before he can be safely discharged home.  It may take 1- 3 weeks based on clinical progression    Mom and dad verbalized understanding of our discussion which was followed by a tour of our [place] by mom and dad.  Requested to ask us any questions as they arise  MD JOLENE Smith MD on call  Time spend in chart review , discussion and note generation is 15 minutes

## 2021-11-10 LAB
ESTIMATED AVERAGE GLUCOSE: 111.2 MG/DL
GLUCOSE BLD-MCNC: 115 MG/DL (ref 70–99)
GLUCOSE BLD-MCNC: 142 MG/DL (ref 70–99)
GLUCOSE BLD-MCNC: 153 MG/DL (ref 70–99)
GLUCOSE BLD-MCNC: 90 MG/DL (ref 70–99)
GROUP B STREP CULTURE: ABNORMAL
HBA1C MFR BLD: 5.5 %
ORGANISM: ABNORMAL
PERFORMED ON: ABNORMAL
PERFORMED ON: NORMAL

## 2021-11-10 PROCEDURE — 1220000000 HC SEMI PRIVATE OB R&B

## 2021-11-10 PROCEDURE — 6370000000 HC RX 637 (ALT 250 FOR IP): Performed by: OBSTETRICS & GYNECOLOGY

## 2021-11-10 PROCEDURE — 2580000003 HC RX 258: Performed by: OBSTETRICS & GYNECOLOGY

## 2021-11-10 PROCEDURE — 59025 FETAL NON-STRESS TEST: CPT

## 2021-11-10 RX ADMIN — NIFEDIPINE 90 MG: 30 TABLET, FILM COATED, EXTENDED RELEASE ORAL at 17:24

## 2021-11-10 RX ADMIN — SODIUM CHLORIDE, PRESERVATIVE FREE 10 ML: 5 INJECTION INTRAVENOUS at 11:09

## 2021-11-10 RX ADMIN — INSULIN HUMAN 60 UNITS: 100 INJECTION, SUSPENSION SUBCUTANEOUS at 09:07

## 2021-11-10 RX ADMIN — INSULIN LISPRO 24 UNITS: 100 INJECTION, SOLUTION INTRAVENOUS; SUBCUTANEOUS at 09:08

## 2021-11-10 RX ADMIN — INSULIN LISPRO 28 UNITS: 100 INJECTION, SOLUTION INTRAVENOUS; SUBCUTANEOUS at 13:46

## 2021-11-10 RX ADMIN — SODIUM CHLORIDE, PRESERVATIVE FREE 10 ML: 5 INJECTION INTRAVENOUS at 23:09

## 2021-11-10 RX ADMIN — LEVOTHYROXINE SODIUM 300 MCG: 0.1 TABLET ORAL at 09:05

## 2021-11-10 RX ADMIN — NIFEDIPINE 30 MG: 30 TABLET, FILM COATED, EXTENDED RELEASE ORAL at 09:05

## 2021-11-10 RX ADMIN — INSULIN LISPRO 28 UNITS: 100 INJECTION, SOLUTION INTRAVENOUS; SUBCUTANEOUS at 18:29

## 2021-11-10 RX ADMIN — INSULIN HUMAN 60 UNITS: 100 INJECTION, SUSPENSION SUBCUTANEOUS at 23:05

## 2021-11-10 NOTE — FLOWSHEET NOTE
Looked in on pt; right lying side with RR of 18. Sleeping soundly. Assessment deferred at this time to allow pt to sleep.

## 2021-11-10 NOTE — FLOWSHEET NOTE
Spoke with Dr. Patricio Quiet regarding patient and increased BP. Per MD recheck BP from 20 minutes from most current blood pressure. If criteria is met, SBP>160 or SBP>100, initiate Hydralazine hypertension protocol and place continuous EFM and TOCO.

## 2021-11-10 NOTE — ADT AUTH CERT
Hypertensive Disorders of Pregnancy - Care Day 6 (11/8/2021) by Jesus Rios RN       Review Status Review Entered   Completed 11/9/2021 15:59      Criteria Review      Care Day: 6 Care Date: 11/8/2021 Level of Care: Inpatient Floor    Guideline Day 2    Clinical Status    ( ) * Blood pressure normal or adequately controlled    (X) * No seizure activity identified    (X) * Laboratory values normal or improved    (X) * Fetal status acceptable    (X) * Delivery not indicated imminently    ( ) * Discharge plans and education understood    Activity    (X) * Ambulatory or acceptable for next level of care    Routes    ( ) * Oral hydration    (X) * Oral medications or regimen acceptable for next level of care    (X) * Oral diet or acceptable for next level of care    Interventions    (X) Fetal monitoring, including daily fetal movements    Medications    (X) Oral antihypertensives    * Milestone   Additional Notes   CARE DAY 6      11/8/2021      Subjective:    Woke up with severe frontal headache which has resolved with Tylenol. Denies vision changes, abdominal pain. + good fetal movements . LE edems is less per patient.                 BP: (!) 166/98 (!) 144/84 (!) 155/85 (!) 165/82   Pulse: 95 90 89 105   Resp: 19     18   Temp: 98.3 °F (36.8 °C)            Physical Examination: General appearance - alert, well appearing, and in no distress   Mental status - alert, oriented to person, place, and time   Abdomen - gravid uterus, no epigastric tenderness   Neurological - alert, oriented, normal speech, no focal findings or movement disorder noted, DTR 2+ , no clonus   Extremities - pedal edema 2 +      WBC 13.0   HGB 13.1         ASSESSMENT AND PLAN:   33w0d IUP   S/P celestone 11/3 and 11/4   1. Chronic hypertension with superimposed preeclampsia: BP in severe range but now controlled with nifedipine 90 mg qd and required 2 doses IV hydralazine. H/O reaction to labetolol so will avoid.     2. Class B DM: BG controlled on insulin: humalog 24/28/28 and NPH 60/60    3. Breech presentation per last USD. Plan CS if persists. 4. Morbid obesity   5.  LGA EFW > 99%         FETAL SURVEILLANCE TESTING SUMMARY       INDICATIONS:   Preeclampsia; class B DM, 33 weeks       OBJECTIVE RESULTS:   Fetal heart variability: moderate   Fetal Heart Rate decelerations: none   Fetal Heart Rate accelerations: yes   Baseline FHR: 145 per minute   Fetal Non-stress Test: reactive       Fetal surveillance: reassuring         Hypertensive Disorders of Pregnancy - Care Day 3 (11/5/2021) by Car Chavarria RN       Review Status Review Entered   Completed 11/9/2021 15:54      Criteria Review      Care Day: 3 Care Date: 11/5/2021 Level of Care: Inpatient Floor    Guideline Day 2    Clinical Status    ( ) * Blood pressure normal or adequately controlled    (X) * No seizure activity identified    (X) * Laboratory values normal or improved    (X) * Fetal status acceptable    (X) * Delivery not indicated imminently    ( ) * Discharge plans and education understood    Activity    (X) * Ambulatory or acceptable for next level of care    Routes    ( ) * Oral hydration    (X) * Oral medications or regimen acceptable for next level of care    (X) * Oral diet or acceptable for next level of care    Interventions    (X) Fetal monitoring, including daily fetal movements    Medications    (X) Oral antihypertensives    * Milestone   Additional Notes   CARE DAY 3      11/5/2021      Subjective:    Membranes: None                           Pain:  none                           Vaginal Drainage:no vaginal discharge                           Bleeding: None                           Fetal Movement: the patients states that the baby moves as usual      BP: (!) 153/73 (!) 147/76 (!) 142/71 134/72      PULSE: 110 109 111 112      Physical Examination: General appearance - alert, well appearing, and in no distress   Chest - clear to auscultation, no wheezes, rales or rhonchi, symmetric air entry   Heart - normal rate, regular rhythm, normal S1, S2, no murmurs, rubs, clicks or gallops   Abdomen - Gravid, Obese, nontender   Extremities - peripheral pulses normal, no pedal edema, no clubbing or cyanosis   Skin - normal coloration and turgor, no rashes, no suspicious skin lesions noted      WBC 13.7   HGB 11.9         NIFEdipine (PROCARDIA XL) extended release tablet 90 mg   Dose: 90 mg   Freq: DAILY Route: PO      ASSESSMENT AND PLAN:   26 yo G1 @ 32w4d   1. Chronic HTN with SIP with severe features - BP stable in mild range with medication, good urine output, labs stable, uric acid increased, 24 hour urine at 364   2. Magnesium sulfate stopped now that the patient is at 48 hours and will continue with expectant management until 34 weeks unless clinical condition indicates earlier delivery   3. Breech presentation, will plan  for delivery   4.  A2 GDM - on regular insulin dosing now, 48 hours from betamethasone

## 2021-11-10 NOTE — PROGRESS NOTES
Department of Obstetrics and Gynecology   Progress Note      Date of Admission: 11/3/2021  Hospital Day: Hospital Day: 8      Subjective:  Patient without complaints this am. Denies contractions, LOF, or VB. +FM. Denies PIH related complaints     Objective:   Vitals:    21 2307 21 2329 11/10/21 0905 11/10/21 1329   BP: (!) 161/83 (!) 159/84 (!) 163/93 (!) 161/84   Pulse: 89 89 96 90   Resp: 18 18 20    Temp:   98.8 °F (37.1 °C)    TempSrc:   Oral    SpO2:       Weight:       Height:           RRR CTAB  Abd non tender  Edema1+, reflexes 2+  SVE: deferred  NST: reactive    Labs:    Recent Labs     21  0845   WBC 13.0*   HGB 13.1   HCT 39.1        Recent Labs     21  0845   *   K 3.7      CO2 22   BUN 8   CREATININE <0.5*   CALCIUM 8.6   AST 20   ALT 11     TSH 6.8  A1C 5.5      ASSESSMENT AND PLAN:  33w0d IUP  S/P celestone 11/3 and   1. Chronic hypertension with superimposed preeclampsia: BP in severe range but now controlled with nifedipine 90 mg QHS and 30 mg PO QAM,  H/O reaction to labetolol so will avoid. 2. Class B DM: BG controlled on insulin: humalog  and NPH 60/60   3. Breech presentation per last USD. Plan CS if persists. Planned at 34 wks  4. Morbid obesity  5.  LGA EFW > 99%  6: Hypothyroidism: dose adjusted    Geovany Alcantara MD

## 2021-11-10 NOTE — PROGRESS NOTES
Nutrition Assessment     Type and Reason for Visit: Initial (LOS assessment)    Nutrition Recommendations/Plan:   1. Allow pt's family to bring in outside food as long as it fits within prescribed carb choices as long as this is OK with MD     Nutrition Assessment:  Pt seen for LOS assessment. Pt reports good appetite and PO intake during admission, consuming 100% of meals. She does report some frustration with the limited menu options and having to eat the same thing over and over again. Pt has had diabetes for the past 8 years and is familiar with carb counting. A1C 5.5. Provided pt with [facility] diet handout and the number of carb servings she is allowed at meals and snacks. This RD is comfortable with allowing family to bring in food from outside the hospital as long as it fits within prescribed carb amount and is OK with MD as it appears admission may be lengthy. Malnutrition Assessment:  Malnutrition Status: No malnutrition    Nutrition Related Findings: +1 generalized edema. Non-pitting trace BUE edema. +1 pitting BLE edema. Current Nutrition Therapies:    ADULT DIET;  Regular; 4 carb choices (60 gm/meal)    Anthropometric Measures:  · Height: 5' 2\" (157.5 cm)  · Current Body Wt: 260 lb 12.9 oz (118.3 kg)   · BMI: 47.7    Nutrition Diagnosis:   No nutrition diagnosis at this time     Nutrition Interventions:   Food and/or Nutrient Delivery:  Continue Current Diet  Nutrition Education/Counseling:  Education completed   Coordination of Nutrition Care:  Continue to monitor while inpatient    Goals:  Pt will continue to consume at least 50% of meals       Nutrition Monitoring and Evaluation:   Behavioral-Environmental Outcomes:  None Identified   Food/Nutrient Intake Outcomes:  Food and Nutrient Intake  Physical Signs/Symptoms Outcomes:  None Identified     Discharge Planning:    No discharge needs at this time     Electronically signed by Aakash Granda RD, LD on 11/10/21 at 2:53 PM EST    Contact: [phone]

## 2021-11-10 NOTE — FLOWSHEET NOTE
Notified Dr. Juris Schilder of BS after breakfast, and of recent BP's and difference in BP's based on location and BP cuff size. Dr. Juris Schilder stated to have RN's check future BP's on upper arm .

## 2021-11-10 NOTE — PROCEDURES
FETAL SURVEILLANCE TESTING SUMMARY    INDICATIONS:  pregnancy-induced hypertension, diabetes mellitus and Morbid obesity    OBJECTIVE RESULTS:  Fetal heart variability: moderate  Fetal Heart Rate decelerations: none  Fetal Heart Rate accelerations: yes  Baseline FHR: 150s per minute  Fetal Non-stress Test: reactive    Fetal surveillance: reassuring

## 2021-11-11 LAB
GLUCOSE BLD-MCNC: 105 MG/DL (ref 70–99)
GLUCOSE BLD-MCNC: 128 MG/DL (ref 70–99)
GLUCOSE BLD-MCNC: 131 MG/DL (ref 70–99)
GLUCOSE BLD-MCNC: 88 MG/DL (ref 70–99)
PERFORMED ON: ABNORMAL
PERFORMED ON: NORMAL

## 2021-11-11 PROCEDURE — 6370000000 HC RX 637 (ALT 250 FOR IP): Performed by: OBSTETRICS & GYNECOLOGY

## 2021-11-11 PROCEDURE — 2580000003 HC RX 258: Performed by: OBSTETRICS & GYNECOLOGY

## 2021-11-11 PROCEDURE — 59025 FETAL NON-STRESS TEST: CPT

## 2021-11-11 PROCEDURE — 1220000000 HC SEMI PRIVATE OB R&B

## 2021-11-11 RX ADMIN — SODIUM CHLORIDE, PRESERVATIVE FREE 10 ML: 5 INJECTION INTRAVENOUS at 22:18

## 2021-11-11 RX ADMIN — INSULIN LISPRO 24 UNITS: 100 INJECTION, SOLUTION INTRAVENOUS; SUBCUTANEOUS at 09:09

## 2021-11-11 RX ADMIN — NIFEDIPINE 30 MG: 30 TABLET, FILM COATED, EXTENDED RELEASE ORAL at 10:07

## 2021-11-11 RX ADMIN — INSULIN HUMAN 60 UNITS: 100 INJECTION, SUSPENSION SUBCUTANEOUS at 22:00

## 2021-11-11 RX ADMIN — NIFEDIPINE 90 MG: 30 TABLET, FILM COATED, EXTENDED RELEASE ORAL at 18:08

## 2021-11-11 RX ADMIN — INSULIN LISPRO 28 UNITS: 100 INJECTION, SOLUTION INTRAVENOUS; SUBCUTANEOUS at 18:10

## 2021-11-11 RX ADMIN — INSULIN HUMAN 60 UNITS: 100 INJECTION, SUSPENSION SUBCUTANEOUS at 09:07

## 2021-11-11 RX ADMIN — INSULIN LISPRO 28 UNITS: 100 INJECTION, SOLUTION INTRAVENOUS; SUBCUTANEOUS at 14:22

## 2021-11-11 RX ADMIN — LEVOTHYROXINE SODIUM 300 MCG: 0.1 TABLET ORAL at 08:53

## 2021-11-11 NOTE — PLAN OF CARE
Problem: Pain:  Goal: Pain level will decrease  Description: Pain level will decrease  Outcome: Ongoing  Goal: Control of acute pain  Description: Control of acute pain  Outcome: Ongoing  Goal: Control of chronic pain  Description: Control of chronic pain  Outcome: Ongoing     Problem: Coping:  Goal: Coping behaviors will improve  Description: Coping behaviors will improve  Outcome: Ongoing  Goal: Ability to verbalize feelings will improve  Description: Ability to verbalize feelings will improve  Outcome: Ongoing     Problem: Fluid Volume:  Goal: Will maintain adequate fluid volume  Description: Will maintain adequate fluid volume  Outcome: Ongoing  Goal: Ability to achieve and maintain adequate urine output will improve  Description: Ability to achieve and maintain adequate urine output will improve  Outcome: Ongoing  Goal: Peripheral tissue perfusion will improve  Description: Peripheral tissue perfusion will improve  Outcome: Ongoing     Problem: Physical Regulation:  Goal: Risk for medication side effects will decrease  Description: Risk for medication side effects will decrease  Outcome: Ongoing  Goal: Will remain free of preeclampsia complications  Description: Will remain free of preeclampsia complications  Outcome: Ongoing  Goal: Signs of adequate cerebral perfusion will increase  Description: Signs of adequate cerebral perfusion will increase  Outcome: Ongoing

## 2021-11-11 NOTE — PROCEDURES
FETAL SURVEILLANCE TESTING SUMMARY    INDICATIONS:  pregnancy-induced hypertension and diabetes mellitus    OBJECTIVE RESULTS:  Fetal heart variability: moderate  Fetal Heart Rate decelerations: none  Fetal Heart Rate accelerations: yes  Baseline FHR: 140 per minute  Fetal Non-stress Test: reactive    Fetal surveillance: reassuring

## 2021-11-12 ENCOUNTER — APPOINTMENT (OUTPATIENT)
Dept: ULTRASOUND IMAGING | Age: 31
End: 2021-11-12
Payer: COMMERCIAL

## 2021-11-12 LAB
GLUCOSE BLD-MCNC: 128 MG/DL (ref 70–99)
GLUCOSE BLD-MCNC: 153 MG/DL (ref 70–99)
GLUCOSE BLD-MCNC: 160 MG/DL (ref 70–99)
PERFORMED ON: ABNORMAL

## 2021-11-12 PROCEDURE — 6370000000 HC RX 637 (ALT 250 FOR IP): Performed by: OBSTETRICS & GYNECOLOGY

## 2021-11-12 PROCEDURE — 1220000000 HC SEMI PRIVATE OB R&B

## 2021-11-12 PROCEDURE — 76819 FETAL BIOPHYS PROFIL W/O NST: CPT

## 2021-11-12 PROCEDURE — 2580000003 HC RX 258: Performed by: OBSTETRICS & GYNECOLOGY

## 2021-11-12 RX ORDER — INSULIN LISPRO 100 [IU]/ML
26 INJECTION, SOLUTION INTRAVENOUS; SUBCUTANEOUS
Status: DISCONTINUED | OUTPATIENT
Start: 2021-11-13 | End: 2021-11-15

## 2021-11-12 RX ADMIN — LEVOTHYROXINE SODIUM 300 MCG: 0.1 TABLET ORAL at 09:29

## 2021-11-12 RX ADMIN — INSULIN LISPRO 28 UNITS: 100 INJECTION, SOLUTION INTRAVENOUS; SUBCUTANEOUS at 12:27

## 2021-11-12 RX ADMIN — SODIUM CHLORIDE, PRESERVATIVE FREE 20 ML: 5 INJECTION INTRAVENOUS at 12:32

## 2021-11-12 RX ADMIN — NIFEDIPINE 30 MG: 30 TABLET, FILM COATED, EXTENDED RELEASE ORAL at 09:28

## 2021-11-12 RX ADMIN — INSULIN HUMAN 60 UNITS: 100 INJECTION, SUSPENSION SUBCUTANEOUS at 22:31

## 2021-11-12 RX ADMIN — INSULIN HUMAN 60 UNITS: 100 INJECTION, SUSPENSION SUBCUTANEOUS at 09:35

## 2021-11-12 RX ADMIN — INSULIN LISPRO 28 UNITS: 100 INJECTION, SOLUTION INTRAVENOUS; SUBCUTANEOUS at 17:38

## 2021-11-12 RX ADMIN — INSULIN LISPRO 24 UNITS: 100 INJECTION, SOLUTION INTRAVENOUS; SUBCUTANEOUS at 09:32

## 2021-11-13 LAB
GLUCOSE BLD-MCNC: 100 MG/DL (ref 70–99)
GLUCOSE BLD-MCNC: 116 MG/DL (ref 70–99)
GLUCOSE BLD-MCNC: 122 MG/DL (ref 70–99)
GLUCOSE BLD-MCNC: 122 MG/DL (ref 70–99)
GLUCOSE BLD-MCNC: 173 MG/DL (ref 70–99)
GLUCOSE BLD-MCNC: 59 MG/DL (ref 70–99)
GLUCOSE BLD-MCNC: 60 MG/DL (ref 70–99)
PERFORMED ON: ABNORMAL

## 2021-11-13 PROCEDURE — 6370000000 HC RX 637 (ALT 250 FOR IP): Performed by: OBSTETRICS & GYNECOLOGY

## 2021-11-13 PROCEDURE — 59025 FETAL NON-STRESS TEST: CPT

## 2021-11-13 PROCEDURE — 2580000003 HC RX 258: Performed by: OBSTETRICS & GYNECOLOGY

## 2021-11-13 PROCEDURE — 1220000000 HC SEMI PRIVATE OB R&B

## 2021-11-13 RX ADMIN — NIFEDIPINE 90 MG: 30 TABLET, FILM COATED, EXTENDED RELEASE ORAL at 17:28

## 2021-11-13 RX ADMIN — INSULIN LISPRO 28 UNITS: 100 INJECTION, SOLUTION INTRAVENOUS; SUBCUTANEOUS at 17:28

## 2021-11-13 RX ADMIN — SODIUM CHLORIDE, PRESERVATIVE FREE 10 ML: 5 INJECTION INTRAVENOUS at 08:15

## 2021-11-13 RX ADMIN — INSULIN HUMAN 60 UNITS: 100 INJECTION, SUSPENSION SUBCUTANEOUS at 22:02

## 2021-11-13 RX ADMIN — INSULIN LISPRO 28 UNITS: 100 INJECTION, SOLUTION INTRAVENOUS; SUBCUTANEOUS at 12:31

## 2021-11-13 RX ADMIN — LEVOTHYROXINE SODIUM 300 MCG: 0.1 TABLET ORAL at 08:14

## 2021-11-13 RX ADMIN — INSULIN LISPRO 26 UNITS: 100 INJECTION, SOLUTION INTRAVENOUS; SUBCUTANEOUS at 08:52

## 2021-11-13 RX ADMIN — INSULIN HUMAN 60 UNITS: 100 INJECTION, SUSPENSION SUBCUTANEOUS at 08:53

## 2021-11-13 RX ADMIN — NIFEDIPINE 30 MG: 30 TABLET, FILM COATED, EXTENDED RELEASE ORAL at 08:14

## 2021-11-13 NOTE — PROGRESS NOTES
[address] Day:             Hospital Day:10      Subjective:   Patient denies HA, vision changes, chest pain/SOB, abdominal pain. Reports good fetal movements .               Objective:   Vitals:    11/12/21 0926 11/12/21 1016 11/12/21 1756 11/12/21 1843   BP: (!) 162/88  (!) 164/86 (!) 171/88   Pulse: 88  99 100   Resp: 18  18 18   Temp: 98.1 °F (36.7 °C)      TempSrc: Oral      SpO2:       Weight:  258 lb (117 kg)     Height:                Physical Examination: General appearance - alert, well appearing, and in no distress  Mental status - alert, oriented to person, place, and time  Abdomen - gravid uterus, no epigastric tenderness  Neurological - alert, oriented, normal speech   DTR 2+ , no clonus  Extremities - pedal edema 1 +      BPP 8/8, CHEMA 17 cm           ASSESSMENT AND PLAN:  33w4d IUP  S/P celestone 11/3 and 11/4  1. Chronic hypertension with superimposed preeclampsia: BP in severe range but moderately controlled with nifedipine 30 mg am and 90 mg pm.  Had allergic reaction to Labetalol. Delivery at 34 weeks or sooner for maternal/fetal indications. 2. Class B DM: BS controlled on insulin: humalog 24/28/28 and NPH 60/60. ! h PP after breakfast 142- will increase Humalog to 26 units  3. Morbid obesity   4. Hypothyroidism- on Synthroid 300 mcg  5. Breech presentation per USD. Plan CS.   6. Fetus:  LGA EFW > 99%, s/p BTMS 11/3- 11/4; BPP twice a week

## 2021-11-14 LAB
GLUCOSE BLD-MCNC: 105 MG/DL (ref 70–99)
GLUCOSE BLD-MCNC: 122 MG/DL (ref 70–99)
GLUCOSE BLD-MCNC: 127 MG/DL (ref 70–99)
GLUCOSE BLD-MCNC: 93 MG/DL (ref 70–99)
PERFORMED ON: ABNORMAL
PERFORMED ON: NORMAL

## 2021-11-14 PROCEDURE — 59025 FETAL NON-STRESS TEST: CPT

## 2021-11-14 PROCEDURE — 6370000000 HC RX 637 (ALT 250 FOR IP): Performed by: OBSTETRICS & GYNECOLOGY

## 2021-11-14 PROCEDURE — 2580000003 HC RX 258: Performed by: OBSTETRICS & GYNECOLOGY

## 2021-11-14 PROCEDURE — 1220000000 HC SEMI PRIVATE OB R&B

## 2021-11-14 RX ADMIN — NIFEDIPINE 90 MG: 30 TABLET, FILM COATED, EXTENDED RELEASE ORAL at 18:00

## 2021-11-14 RX ADMIN — SODIUM CHLORIDE, PRESERVATIVE FREE 10 ML: 5 INJECTION INTRAVENOUS at 16:45

## 2021-11-14 RX ADMIN — LEVOTHYROXINE SODIUM 300 MCG: 0.1 TABLET ORAL at 09:06

## 2021-11-14 RX ADMIN — INSULIN HUMAN 60 UNITS: 100 INJECTION, SUSPENSION SUBCUTANEOUS at 09:12

## 2021-11-14 RX ADMIN — INSULIN HUMAN 30 UNITS: 100 INJECTION, SUSPENSION SUBCUTANEOUS at 22:09

## 2021-11-14 RX ADMIN — INSULIN LISPRO 28 UNITS: 100 INJECTION, SOLUTION INTRAVENOUS; SUBCUTANEOUS at 18:00

## 2021-11-14 RX ADMIN — INSULIN LISPRO 28 UNITS: 100 INJECTION, SOLUTION INTRAVENOUS; SUBCUTANEOUS at 13:49

## 2021-11-14 RX ADMIN — NIFEDIPINE 30 MG: 30 TABLET, FILM COATED, EXTENDED RELEASE ORAL at 09:07

## 2021-11-14 RX ADMIN — INSULIN LISPRO 26 UNITS: 100 INJECTION, SOLUTION INTRAVENOUS; SUBCUTANEOUS at 09:11

## 2021-11-14 NOTE — PROCEDURES
FETAL SURVEILLANCE TESTING SUMMARY    INDICATIONS:  pregnancy-induced hypertension    OBJECTIVE RESULTS:  Fetal heart variability: moderate  Fetal Heart Rate accelerations: yes  Baseline FHR: 145 per minute  Fetal Non-stress Test: reactive    Fetal surveillance: reassuring

## 2021-11-14 NOTE — FLOWSHEET NOTE
Dr. Jacki Montalvo made aware of decreased blood glucose, 59 and 60. Patient given a snack and will recheck. No new orders.

## 2021-11-14 NOTE — PROGRESS NOTES
Department of Obstetrics and Gynecology   Progress Note    Date of Admission: 11/3/2021  Hospital Day:  Hospital Day: 12      Subjective: Denies PIH sx. Q's about post-op course answered. Objective:   Vitals:    21 1718 21 1720 21 2202 21 0907   BP: (!) 186/99 (!) 157/74 (!) 148/80 (!) 150/85   Pulse: 97 88 93 93   Resp:     Temp:  99.1 °F (37.3 °C) 98.5 °F (36.9 °C) 98.1 °F (36.7 °C)   TempSrc:  Oral Oral Oral   SpO2:       Weight:       Height:           Physical Examination: General appearance - alert, well appearing, and in no distress  Chest - normal respiratory effort  Abdomen - gravid NT    Lab Results   Component Value Date    WBC 13.0 (H) 2021    HGB 13.1 2021    HCT 39.1 2021     2021    CHOL 214 (H) 2015    TRIG 612 (H) 2015    HDL 30 (L) 2015    LDLDIRECT 92 2015    ALT 11 2021    AST 20 2021     (L) 2021    K 3.7 2021     2021    CREATININE <0.5 (L) 2021    BUN 8 2021    CO2 22 2021    TSH 1.34 2021    LABA1C 5.5 2021    LABMICR Not Indicated 2014     ASSESSMENT AND PLAN:    33w6d with preexisting DM, severe preeclampsia for delivery tomorrow. LTCS for breech.    Insulin adjusted for pre-op NPO  Fetal status reassuring  Update labs for OR

## 2021-11-15 VITALS — OXYGEN SATURATION: 99 % | TEMPERATURE: 98.6 F | DIASTOLIC BLOOD PRESSURE: 70 MMHG | SYSTOLIC BLOOD PRESSURE: 127 MMHG

## 2021-11-15 LAB
A/G RATIO: 0.9 (ref 1.1–2.2)
ABO/RH: NORMAL
ALBUMIN SERPL-MCNC: 3.5 G/DL (ref 3.4–5)
ALP BLD-CCNC: 201 U/L (ref 40–129)
ALT SERPL-CCNC: 16 U/L (ref 10–40)
ANION GAP SERPL CALCULATED.3IONS-SCNC: 13 MMOL/L (ref 3–16)
ANTIBODY IDENTIFICATION: NORMAL
ANTIBODY SCREEN: NORMAL
AST SERPL-CCNC: 24 U/L (ref 15–37)
BILIRUB SERPL-MCNC: 0.7 MG/DL (ref 0–1)
BUN BLDV-MCNC: 12 MG/DL (ref 7–20)
CALCIUM SERPL-MCNC: 10 MG/DL (ref 8.3–10.6)
CHLORIDE BLD-SCNC: 101 MMOL/L (ref 99–110)
CO2: 22 MMOL/L (ref 21–32)
CREAT SERPL-MCNC: <0.5 MG/DL (ref 0.6–1.1)
DAT IGG CAPTURE: NORMAL
GFR AFRICAN AMERICAN: >60
GFR NON-AFRICAN AMERICAN: >60
GLUCOSE BLD-MCNC: 100 MG/DL (ref 70–99)
GLUCOSE BLD-MCNC: 126 MG/DL (ref 70–99)
GLUCOSE BLD-MCNC: 129 MG/DL (ref 70–99)
GLUCOSE BLD-MCNC: 99 MG/DL (ref 70–99)
GLUCOSE BLD-MCNC: 99 MG/DL (ref 70–99)
HCT VFR BLD CALC: 41.6 % (ref 36–48)
HEMOGLOBIN: 14.1 G/DL (ref 12–16)
MCH RBC QN AUTO: 27.2 PG (ref 26–34)
MCHC RBC AUTO-ENTMCNC: 33.9 G/DL (ref 31–36)
MCV RBC AUTO: 80.2 FL (ref 80–100)
PDW BLD-RTO: 14.6 % (ref 12.4–15.4)
PERFORMED ON: ABNORMAL
PERFORMED ON: ABNORMAL
PERFORMED ON: NORMAL
PERFORMED ON: NORMAL
PLATELET # BLD: 175 K/UL (ref 135–450)
PMV BLD AUTO: 10.8 FL (ref 5–10.5)
POTASSIUM SERPL-SCNC: 4.1 MMOL/L (ref 3.5–5.1)
RBC # BLD: 5.19 M/UL (ref 4–5.2)
SODIUM BLD-SCNC: 136 MMOL/L (ref 136–145)
TOTAL PROTEIN: 7.2 G/DL (ref 6.4–8.2)
WBC # BLD: 12.7 K/UL (ref 4–11)

## 2021-11-15 PROCEDURE — 6360000002 HC RX W HCPCS: Performed by: OBSTETRICS & GYNECOLOGY

## 2021-11-15 PROCEDURE — 2580000003 HC RX 258: Performed by: OBSTETRICS & GYNECOLOGY

## 2021-11-15 PROCEDURE — 2500000003 HC RX 250 WO HCPCS: Performed by: NURSE ANESTHETIST, CERTIFIED REGISTERED

## 2021-11-15 PROCEDURE — 88307 TISSUE EXAM BY PATHOLOGIST: CPT

## 2021-11-15 PROCEDURE — 85027 COMPLETE CBC AUTOMATED: CPT

## 2021-11-15 PROCEDURE — 6360000002 HC RX W HCPCS: Performed by: NURSE ANESTHETIST, CERTIFIED REGISTERED

## 2021-11-15 PROCEDURE — 86870 RBC ANTIBODY IDENTIFICATION: CPT

## 2021-11-15 PROCEDURE — 7100000000 HC PACU RECOVERY - FIRST 15 MIN: Performed by: OBSTETRICS & GYNECOLOGY

## 2021-11-15 PROCEDURE — 3609079900 HC CESAREAN SECTION: Performed by: OBSTETRICS & GYNECOLOGY

## 2021-11-15 PROCEDURE — 6370000000 HC RX 637 (ALT 250 FOR IP): Performed by: OBSTETRICS & GYNECOLOGY

## 2021-11-15 PROCEDURE — 2500000003 HC RX 250 WO HCPCS: Performed by: OBSTETRICS & GYNECOLOGY

## 2021-11-15 PROCEDURE — 86880 COOMBS TEST DIRECT: CPT

## 2021-11-15 PROCEDURE — 7100000001 HC PACU RECOVERY - ADDTL 15 MIN: Performed by: OBSTETRICS & GYNECOLOGY

## 2021-11-15 PROCEDURE — 3700000001 HC ADD 15 MINUTES (ANESTHESIA): Performed by: OBSTETRICS & GYNECOLOGY

## 2021-11-15 PROCEDURE — 86901 BLOOD TYPING SEROLOGIC RH(D): CPT

## 2021-11-15 PROCEDURE — 86850 RBC ANTIBODY SCREEN: CPT

## 2021-11-15 PROCEDURE — 2709999900 HC NON-CHARGEABLE SUPPLY: Performed by: OBSTETRICS & GYNECOLOGY

## 2021-11-15 PROCEDURE — 3700000000 HC ANESTHESIA ATTENDED CARE: Performed by: OBSTETRICS & GYNECOLOGY

## 2021-11-15 PROCEDURE — 80053 COMPREHEN METABOLIC PANEL: CPT

## 2021-11-15 PROCEDURE — 1200000000 HC SEMI PRIVATE

## 2021-11-15 PROCEDURE — 86900 BLOOD TYPING SEROLOGIC ABO: CPT

## 2021-11-15 RX ORDER — CLINDAMYCIN HYDROCHLORIDE 150 MG/1
300 CAPSULE ORAL EVERY 8 HOURS SCHEDULED
Status: DISCONTINUED | OUTPATIENT
Start: 2021-11-15 | End: 2021-11-18 | Stop reason: HOSPADM

## 2021-11-15 RX ORDER — IBUPROFEN 800 MG/1
800 TABLET ORAL EVERY 6 HOURS PRN
Qty: 120 TABLET | Refills: 3 | Status: SHIPPED | OUTPATIENT
Start: 2021-11-15

## 2021-11-15 RX ORDER — CARBOPROST TROMETHAMINE 250 UG/ML
250 INJECTION, SOLUTION INTRAMUSCULAR PRN
Status: DISCONTINUED | OUTPATIENT
Start: 2021-11-15 | End: 2021-11-18 | Stop reason: HOSPADM

## 2021-11-15 RX ORDER — ACETAMINOPHEN 500 MG
1000 TABLET ORAL ONCE
Status: COMPLETED | OUTPATIENT
Start: 2021-11-15 | End: 2021-11-15

## 2021-11-15 RX ORDER — OXYCODONE HYDROCHLORIDE 5 MG/1
5 CAPSULE ORAL EVERY 4 HOURS PRN
Qty: 30 CAPSULE | Refills: 0 | Status: SHIPPED | OUTPATIENT
Start: 2021-11-15 | End: 2021-11-22

## 2021-11-15 RX ORDER — MODIFIED LANOLIN
OINTMENT (GRAM) TOPICAL
Status: DISCONTINUED | OUTPATIENT
Start: 2021-11-15 | End: 2021-11-18 | Stop reason: HOSPADM

## 2021-11-15 RX ORDER — ONDANSETRON 2 MG/ML
4 INJECTION INTRAMUSCULAR; INTRAVENOUS EVERY 6 HOURS PRN
Status: DISCONTINUED | OUTPATIENT
Start: 2021-11-15 | End: 2021-11-18 | Stop reason: HOSPADM

## 2021-11-15 RX ORDER — SODIUM CHLORIDE 0.9 % (FLUSH) 0.9 %
10 SYRINGE (ML) INJECTION PRN
Status: DISCONTINUED | OUTPATIENT
Start: 2021-11-15 | End: 2021-11-15

## 2021-11-15 RX ORDER — SODIUM CHLORIDE 9 MG/ML
25 INJECTION, SOLUTION INTRAVENOUS PRN
Status: DISCONTINUED | OUTPATIENT
Start: 2021-11-15 | End: 2021-11-15

## 2021-11-15 RX ORDER — PRENATAL VIT/IRON FUM/FOLIC AC 27MG-0.8MG
1 TABLET ORAL DAILY
Status: DISCONTINUED | OUTPATIENT
Start: 2021-11-15 | End: 2021-11-18 | Stop reason: HOSPADM

## 2021-11-15 RX ORDER — BUPIVACAINE HYDROCHLORIDE 7.5 MG/ML
INJECTION, SOLUTION INTRASPINAL PRN
Status: DISCONTINUED | OUTPATIENT
Start: 2021-11-15 | End: 2021-11-15 | Stop reason: SDUPTHER

## 2021-11-15 RX ORDER — METOCLOPRAMIDE HYDROCHLORIDE 5 MG/ML
10 INJECTION INTRAMUSCULAR; INTRAVENOUS ONCE
Status: COMPLETED | OUTPATIENT
Start: 2021-11-15 | End: 2021-11-15

## 2021-11-15 RX ORDER — DIPHENHYDRAMINE HYDROCHLORIDE 50 MG/ML
25 INJECTION INTRAMUSCULAR; INTRAVENOUS EVERY 6 HOURS PRN
Status: DISCONTINUED | OUTPATIENT
Start: 2021-11-15 | End: 2021-11-18 | Stop reason: HOSPADM

## 2021-11-15 RX ORDER — MORPHINE SULFATE 10 MG/ML
INJECTION, SOLUTION INTRAMUSCULAR; INTRAVENOUS PRN
Status: DISCONTINUED | OUTPATIENT
Start: 2021-11-15 | End: 2021-11-15 | Stop reason: SDUPTHER

## 2021-11-15 RX ORDER — TRISODIUM CITRATE DIHYDRATE AND CITRIC ACID MONOHYDRATE 500; 334 MG/5ML; MG/5ML
30 SOLUTION ORAL ONCE
Status: COMPLETED | OUTPATIENT
Start: 2021-11-15 | End: 2021-11-15

## 2021-11-15 RX ORDER — SODIUM CHLORIDE 0.9 % (FLUSH) 0.9 %
10 SYRINGE (ML) INJECTION EVERY 12 HOURS SCHEDULED
Status: DISCONTINUED | OUTPATIENT
Start: 2021-11-15 | End: 2021-11-15

## 2021-11-15 RX ORDER — ACETAMINOPHEN 500 MG
1000 TABLET ORAL 3 TIMES DAILY
Qty: 120 TABLET | Refills: 3 | Status: SHIPPED | OUTPATIENT
Start: 2021-11-15 | End: 2021-12-15

## 2021-11-15 RX ORDER — INSULIN LISPRO 100 [IU]/ML
14 INJECTION, SOLUTION INTRAVENOUS; SUBCUTANEOUS 2 TIMES DAILY WITH MEALS
Status: DISCONTINUED | OUTPATIENT
Start: 2021-11-15 | End: 2021-11-17

## 2021-11-15 RX ORDER — DEXAMETHASONE SODIUM PHOSPHATE 4 MG/ML
INJECTION, SOLUTION INTRA-ARTICULAR; INTRALESIONAL; INTRAMUSCULAR; INTRAVENOUS; SOFT TISSUE PRN
Status: DISCONTINUED | OUTPATIENT
Start: 2021-11-15 | End: 2021-11-15 | Stop reason: SDUPTHER

## 2021-11-15 RX ORDER — KETOROLAC TROMETHAMINE 30 MG/ML
INJECTION, SOLUTION INTRAMUSCULAR; INTRAVENOUS PRN
Status: DISCONTINUED | OUTPATIENT
Start: 2021-11-15 | End: 2021-11-15 | Stop reason: SDUPTHER

## 2021-11-15 RX ORDER — ACETAMINOPHEN 500 MG
1000 TABLET ORAL EVERY 8 HOURS SCHEDULED
Status: DISCONTINUED | OUTPATIENT
Start: 2021-11-15 | End: 2021-11-18 | Stop reason: HOSPADM

## 2021-11-15 RX ORDER — SODIUM CHLORIDE, SODIUM LACTATE, POTASSIUM CHLORIDE, CALCIUM CHLORIDE 600; 310; 30; 20 MG/100ML; MG/100ML; MG/100ML; MG/100ML
INJECTION, SOLUTION INTRAVENOUS CONTINUOUS
Status: DISCONTINUED | OUTPATIENT
Start: 2021-11-15 | End: 2021-11-18 | Stop reason: HOSPADM

## 2021-11-15 RX ORDER — POLYETHYLENE GLYCOL 3350 17 G/17G
17 POWDER, FOR SOLUTION ORAL DAILY
Status: DISCONTINUED | OUTPATIENT
Start: 2021-11-15 | End: 2021-11-18 | Stop reason: HOSPADM

## 2021-11-15 RX ORDER — IBUPROFEN 800 MG/1
800 TABLET ORAL EVERY 8 HOURS SCHEDULED
Status: DISCONTINUED | OUTPATIENT
Start: 2021-11-15 | End: 2021-11-18 | Stop reason: HOSPADM

## 2021-11-15 RX ORDER — OXYCODONE HYDROCHLORIDE 5 MG/1
5 TABLET ORAL EVERY 4 HOURS PRN
Status: DISCONTINUED | OUTPATIENT
Start: 2021-11-15 | End: 2021-11-18 | Stop reason: HOSPADM

## 2021-11-15 RX ORDER — ONDANSETRON 2 MG/ML
INJECTION INTRAMUSCULAR; INTRAVENOUS PRN
Status: DISCONTINUED | OUTPATIENT
Start: 2021-11-15 | End: 2021-11-15 | Stop reason: SDUPTHER

## 2021-11-15 RX ORDER — SODIUM CHLORIDE 9 MG/ML
25 INJECTION, SOLUTION INTRAVENOUS PRN
Status: DISCONTINUED | OUTPATIENT
Start: 2021-11-15 | End: 2021-11-18 | Stop reason: HOSPADM

## 2021-11-15 RX ORDER — SODIUM CHLORIDE 0.9 % (FLUSH) 0.9 %
5-40 SYRINGE (ML) INJECTION PRN
Status: DISCONTINUED | OUTPATIENT
Start: 2021-11-15 | End: 2021-11-18 | Stop reason: HOSPADM

## 2021-11-15 RX ORDER — DOCUSATE SODIUM 100 MG/1
100 CAPSULE, LIQUID FILLED ORAL 2 TIMES DAILY
Status: DISCONTINUED | OUTPATIENT
Start: 2021-11-15 | End: 2021-11-18 | Stop reason: HOSPADM

## 2021-11-15 RX ORDER — DOCUSATE SODIUM 100 MG/1
100 CAPSULE, LIQUID FILLED ORAL DAILY PRN
Qty: 60 CAPSULE | Refills: 1 | Status: SHIPPED | OUTPATIENT
Start: 2021-11-15

## 2021-11-15 RX ORDER — OXYCODONE HYDROCHLORIDE 5 MG/1
10 TABLET ORAL EVERY 4 HOURS PRN
Status: DISCONTINUED | OUTPATIENT
Start: 2021-11-15 | End: 2021-11-18 | Stop reason: HOSPADM

## 2021-11-15 RX ORDER — FENTANYL CITRATE 50 UG/ML
INJECTION, SOLUTION INTRAMUSCULAR; INTRAVENOUS PRN
Status: DISCONTINUED | OUTPATIENT
Start: 2021-11-15 | End: 2021-11-15 | Stop reason: SDUPTHER

## 2021-11-15 RX ORDER — METHYLERGONOVINE MALEATE 0.2 MG/ML
200 INJECTION INTRAVENOUS PRN
Status: DISCONTINUED | OUTPATIENT
Start: 2021-11-15 | End: 2021-11-18 | Stop reason: HOSPADM

## 2021-11-15 RX ORDER — EPHEDRINE SULFATE 50 MG/ML
INJECTION INTRAVENOUS PRN
Status: DISCONTINUED | OUTPATIENT
Start: 2021-11-15 | End: 2021-11-15 | Stop reason: SDUPTHER

## 2021-11-15 RX ORDER — CLINDAMYCIN PHOSPHATE 900 MG/50ML
900 INJECTION INTRAVENOUS ONCE
Status: COMPLETED | OUTPATIENT
Start: 2021-11-15 | End: 2021-11-15

## 2021-11-15 RX ORDER — SODIUM CHLORIDE 0.9 % (FLUSH) 0.9 %
5-40 SYRINGE (ML) INJECTION EVERY 12 HOURS SCHEDULED
Status: DISCONTINUED | OUTPATIENT
Start: 2021-11-15 | End: 2021-11-18 | Stop reason: HOSPADM

## 2021-11-15 RX ORDER — ACETAMINOPHEN 325 MG/1
650 TABLET ORAL EVERY 4 HOURS PRN
Status: DISCONTINUED | OUTPATIENT
Start: 2021-11-15 | End: 2021-11-18 | Stop reason: HOSPADM

## 2021-11-15 RX ORDER — SIMETHICONE 80 MG
80 TABLET,CHEWABLE ORAL EVERY 6 HOURS PRN
Status: DISCONTINUED | OUTPATIENT
Start: 2021-11-15 | End: 2021-11-18 | Stop reason: HOSPADM

## 2021-11-15 RX ORDER — KETOROLAC TROMETHAMINE 30 MG/ML
30 INJECTION, SOLUTION INTRAMUSCULAR; INTRAVENOUS EVERY 8 HOURS
Status: DISCONTINUED | OUTPATIENT
Start: 2021-11-15 | End: 2021-11-18

## 2021-11-15 RX ADMIN — PHENYLEPHRINE HYDROCHLORIDE 100 MCG: 10 INJECTION INTRAVENOUS at 11:10

## 2021-11-15 RX ADMIN — KETOROLAC TROMETHAMINE 30 MG: 30 INJECTION, SOLUTION INTRAMUSCULAR; INTRAVENOUS at 11:10

## 2021-11-15 RX ADMIN — ONDANSETRON 4 MG: 2 INJECTION INTRAMUSCULAR; INTRAVENOUS at 10:42

## 2021-11-15 RX ADMIN — FAMOTIDINE 20 MG: 10 INJECTION, SOLUTION INTRAVENOUS at 09:15

## 2021-11-15 RX ADMIN — ONDANSETRON 4 MG: 2 INJECTION INTRAMUSCULAR; INTRAVENOUS at 18:35

## 2021-11-15 RX ADMIN — SODIUM CHLORIDE, PRESERVATIVE FREE 10 ML: 5 INJECTION INTRAVENOUS at 22:03

## 2021-11-15 RX ADMIN — FENTANYL CITRATE 15 MCG: 50 INJECTION, SOLUTION INTRAMUSCULAR; INTRAVENOUS at 10:30

## 2021-11-15 RX ADMIN — SODIUM CHLORIDE 1000 ML: 9 INJECTION, SOLUTION INTRAVENOUS at 12:03

## 2021-11-15 RX ADMIN — KETOROLAC TROMETHAMINE 30 MG: 30 INJECTION, SOLUTION INTRAMUSCULAR; INTRAVENOUS at 18:44

## 2021-11-15 RX ADMIN — PHENYLEPHRINE HYDROCHLORIDE 100 MCG: 10 INJECTION INTRAVENOUS at 11:02

## 2021-11-15 RX ADMIN — GENTAMICIN SULFATE 120 MG: 40 INJECTION, SOLUTION INTRAMUSCULAR; INTRAVENOUS at 10:30

## 2021-11-15 RX ADMIN — METOCLOPRAMIDE 10 MG: 5 INJECTION, SOLUTION INTRAMUSCULAR; INTRAVENOUS at 09:15

## 2021-11-15 RX ADMIN — ACETAMINOPHEN 1000 MG: 500 TABLET ORAL at 22:54

## 2021-11-15 RX ADMIN — PHENYLEPHRINE HYDROCHLORIDE 150 MCG: 10 INJECTION INTRAVENOUS at 10:55

## 2021-11-15 RX ADMIN — EPHEDRINE SULFATE 5 MG: 50 INJECTION, SOLUTION INTRAVENOUS at 10:41

## 2021-11-15 RX ADMIN — BUPIVACAINE HYDROCHLORIDE 1.7 MG: 7.5 INJECTION, SOLUTION INTRASPINAL at 10:30

## 2021-11-15 RX ADMIN — CLINDAMYCIN PHOSPHATE 900 MG: 900 INJECTION, SOLUTION INTRAVENOUS at 09:56

## 2021-11-15 RX ADMIN — MAGNESIUM SULFATE HEPTAHYDRATE 2000 MG/HR: 40 INJECTION, SOLUTION INTRAVENOUS at 23:00

## 2021-11-15 RX ADMIN — EPHEDRINE SULFATE 10 MG: 50 INJECTION, SOLUTION INTRAVENOUS at 10:52

## 2021-11-15 RX ADMIN — ACETAMINOPHEN 1000 MG: 500 TABLET ORAL at 09:15

## 2021-11-15 RX ADMIN — Medication 100 ML: at 11:08

## 2021-11-15 RX ADMIN — SODIUM CHLORIDE: 9 INJECTION, SOLUTION INTRAVENOUS at 07:54

## 2021-11-15 RX ADMIN — EPHEDRINE SULFATE 15 MG: 50 INJECTION, SOLUTION INTRAVENOUS at 10:34

## 2021-11-15 RX ADMIN — MAGNESIUM SULFATE HEPTAHYDRATE 2000 MG/HR: 40 INJECTION, SOLUTION INTRAVENOUS at 12:55

## 2021-11-15 RX ADMIN — Medication 200 ML: at 11:38

## 2021-11-15 RX ADMIN — Medication 87.3 MILLI-UNITS/MIN: at 12:09

## 2021-11-15 RX ADMIN — DEXAMETHASONE SODIUM PHOSPHATE 8 MG: 4 INJECTION, SOLUTION INTRAMUSCULAR; INTRAVENOUS at 11:10

## 2021-11-15 RX ADMIN — SODIUM CITRATE AND CITRIC ACID MONOHYDRATE 30 ML: 500; 334 SOLUTION ORAL at 09:15

## 2021-11-15 RX ADMIN — EPHEDRINE SULFATE 10 MG: 50 INJECTION, SOLUTION INTRAVENOUS at 10:47

## 2021-11-15 RX ADMIN — MORPHINE SULFATE 0.2 MG: 10 INJECTION, SOLUTION INTRAMUSCULAR; INTRAVENOUS at 10:30

## 2021-11-15 RX ADMIN — ACETAMINOPHEN 1000 MG: 500 TABLET ORAL at 16:18

## 2021-11-15 RX ADMIN — EPHEDRINE SULFATE 10 MG: 50 INJECTION, SOLUTION INTRAVENOUS at 11:17

## 2021-11-15 RX ADMIN — CLINDAMYCIN HYDROCHLORIDE 300 MG: 150 CAPSULE ORAL at 21:10

## 2021-11-15 ASSESSMENT — PULMONARY FUNCTION TESTS
PIF_VALUE: 0
PIF_VALUE: 1
PIF_VALUE: 0

## 2021-11-15 ASSESSMENT — PAIN SCALES - GENERAL
PAINLEVEL_OUTOF10: 0
PAINLEVEL_OUTOF10: 3
PAINLEVEL_OUTOF10: 3

## 2021-11-15 NOTE — ANESTHESIA PROCEDURE NOTES
Spinal Block    Patient location during procedure: OB  Start time: 11/15/2021 10:27 AM  End time: 11/15/2021 10:31 AM  Reason for block: primary anesthetic  Staffing  Performed: resident/CRNA   Anesthesiologist: Kimberly Malin MD  Resident/CRNA: DANIEL Ponce CRNA  Preanesthetic Checklist  Completed: patient identified, IV checked, site marked, risks and benefits discussed, surgical consent, monitors and equipment checked, pre-op evaluation, timeout performed, anesthesia consent given, oxygen available and patient being monitored  Spinal Block  Patient position: sitting  Prep: ChloraPrep  Patient monitoring: continuous pulse ox and frequent blood pressure checks  Approach: midline  Location: L3/L4  Provider prep: mask and sterile gloves  Local infiltration: lidocaine  Dose: 0.2  Agent: bupivacaine  Adjuvant: duramorph  Dose: 1.7  Dose: 1.7  Needle  Needle type: pencil-tip   Needle gauge: 25 G  Needle length: 3.5 in  Assessment  Sensory level: T4  Swirl obtained: Yes  CSF: clear  Attempts: 1  Hemodynamics: stable

## 2021-11-15 NOTE — H&P
Date of Surgery Update:  Andrea Pro was seen, history and physical examination reviewed, and patient examined by me today. There have been no significant clinical changes since the completion of the previous history and physical.    The risk, benefits, and alternatives of the proposed procedure have been explained to the patient (or appropriate guardian) and understanding verbalized. All questions answered. Patient wishes to proceed. Electronically signed by: Matt Blackman MD,11/15/2021,6:50 AM     Breech presentation, severe pre-eclampsia with in patient management    Currently 34 weeks.   Will proceed with C/S today

## 2021-11-15 NOTE — FLOWSHEET NOTE
IV restarted in right forearm and blood work done for surgery in am and sent to lab. Tolerated well.

## 2021-11-16 LAB
A/G RATIO: 1 (ref 1.1–2.2)
ABO/RH: NORMAL
ALBUMIN SERPL-MCNC: 3 G/DL (ref 3.4–5)
ALP BLD-CCNC: 158 U/L (ref 40–129)
ALT SERPL-CCNC: 16 U/L (ref 10–40)
ANION GAP SERPL CALCULATED.3IONS-SCNC: 9 MMOL/L (ref 3–16)
AST SERPL-CCNC: 22 U/L (ref 15–37)
BILIRUB SERPL-MCNC: 0.6 MG/DL (ref 0–1)
BUN BLDV-MCNC: 10 MG/DL (ref 7–20)
CALCIUM SERPL-MCNC: 7.9 MG/DL (ref 8.3–10.6)
CHLORIDE BLD-SCNC: 103 MMOL/L (ref 99–110)
CO2: 22 MMOL/L (ref 21–32)
CREAT SERPL-MCNC: <0.5 MG/DL (ref 0.6–1.1)
FETAL SCREEN: NORMAL
GFR AFRICAN AMERICAN: >60
GFR NON-AFRICAN AMERICAN: >60
GLUCOSE BLD-MCNC: 121 MG/DL (ref 70–99)
GLUCOSE BLD-MCNC: 141 MG/DL (ref 70–99)
GLUCOSE BLD-MCNC: 187 MG/DL (ref 70–99)
GLUCOSE BLD-MCNC: 98 MG/DL (ref 70–99)
HCT VFR BLD CALC: 36.8 % (ref 36–48)
HEMOGLOBIN: 12.4 G/DL (ref 12–16)
MCH RBC QN AUTO: 27.1 PG (ref 26–34)
MCHC RBC AUTO-ENTMCNC: 33.6 G/DL (ref 31–36)
MCV RBC AUTO: 80.5 FL (ref 80–100)
PDW BLD-RTO: 14.3 % (ref 12.4–15.4)
PERFORMED ON: ABNORMAL
PLATELET # BLD: 177 K/UL (ref 135–450)
PMV BLD AUTO: 10.1 FL (ref 5–10.5)
POTASSIUM SERPL-SCNC: 4.5 MMOL/L (ref 3.5–5.1)
RBC # BLD: 4.58 M/UL (ref 4–5.2)
RHIG LOT NUMBER: NORMAL
SODIUM BLD-SCNC: 134 MMOL/L (ref 136–145)
TOTAL PROTEIN: 6.1 G/DL (ref 6.4–8.2)
WBC # BLD: 11.9 K/UL (ref 4–11)

## 2021-11-16 PROCEDURE — 85461 HEMOGLOBIN FETAL: CPT

## 2021-11-16 PROCEDURE — 6370000000 HC RX 637 (ALT 250 FOR IP): Performed by: OBSTETRICS & GYNECOLOGY

## 2021-11-16 PROCEDURE — 96372 THER/PROPH/DIAG INJ SC/IM: CPT

## 2021-11-16 PROCEDURE — 85027 COMPLETE CBC AUTOMATED: CPT

## 2021-11-16 PROCEDURE — 6360000002 HC RX W HCPCS: Performed by: OBSTETRICS & GYNECOLOGY

## 2021-11-16 PROCEDURE — 86900 BLOOD TYPING SEROLOGIC ABO: CPT

## 2021-11-16 PROCEDURE — 2580000003 HC RX 258: Performed by: OBSTETRICS & GYNECOLOGY

## 2021-11-16 PROCEDURE — 86901 BLOOD TYPING SEROLOGIC RH(D): CPT

## 2021-11-16 PROCEDURE — 80053 COMPREHEN METABOLIC PANEL: CPT

## 2021-11-16 PROCEDURE — 1200000000 HC SEMI PRIVATE

## 2021-11-16 RX ADMIN — OXYCODONE 10 MG: 5 TABLET ORAL at 13:14

## 2021-11-16 RX ADMIN — SODIUM CHLORIDE, PRESERVATIVE FREE 10 ML: 5 INJECTION INTRAVENOUS at 21:19

## 2021-11-16 RX ADMIN — LEVOTHYROXINE SODIUM 300 MCG: 0.1 TABLET ORAL at 05:47

## 2021-11-16 RX ADMIN — IBUPROFEN 800 MG: 800 TABLET, FILM COATED ORAL at 10:22

## 2021-11-16 RX ADMIN — ONDANSETRON 4 MG: 2 INJECTION INTRAMUSCULAR; INTRAVENOUS at 00:34

## 2021-11-16 RX ADMIN — NIFEDIPINE 30 MG: 30 TABLET, FILM COATED, EXTENDED RELEASE ORAL at 08:58

## 2021-11-16 RX ADMIN — CLINDAMYCIN HYDROCHLORIDE 300 MG: 150 CAPSULE ORAL at 15:15

## 2021-11-16 RX ADMIN — OXYCODONE 5 MG: 5 TABLET ORAL at 23:10

## 2021-11-16 RX ADMIN — HUMAN RHO(D) IMMUNE GLOBULIN 300 MCG: 300 INJECTION, SOLUTION INTRAMUSCULAR at 16:34

## 2021-11-16 RX ADMIN — CLINDAMYCIN HYDROCHLORIDE 300 MG: 150 CAPSULE ORAL at 23:10

## 2021-11-16 RX ADMIN — INSULIN LISPRO 14 UNITS: 100 INJECTION, SOLUTION INTRAVENOUS; SUBCUTANEOUS at 17:44

## 2021-11-16 RX ADMIN — SODIUM CHLORIDE 25 ML: 9 INJECTION, SOLUTION INTRAVENOUS at 00:36

## 2021-11-16 RX ADMIN — ENOXAPARIN SODIUM 60 MG: 100 INJECTION SUBCUTANEOUS at 23:11

## 2021-11-16 RX ADMIN — ACETAMINOPHEN 1000 MG: 500 TABLET ORAL at 05:47

## 2021-11-16 RX ADMIN — ACETAMINOPHEN 1000 MG: 500 TABLET ORAL at 23:10

## 2021-11-16 RX ADMIN — ENOXAPARIN SODIUM 60 MG: 100 INJECTION SUBCUTANEOUS at 00:35

## 2021-11-16 RX ADMIN — IBUPROFEN 800 MG: 800 TABLET, FILM COATED ORAL at 18:49

## 2021-11-16 RX ADMIN — DOCUSATE SODIUM 100 MG: 100 CAPSULE ORAL at 09:02

## 2021-11-16 RX ADMIN — PRENATAL VIT W/ FE FUMARATE-FA TAB 27-0.8 MG 1 TABLET: 27-0.8 TAB at 09:02

## 2021-11-16 RX ADMIN — ACETAMINOPHEN 1000 MG: 500 TABLET ORAL at 15:14

## 2021-11-16 RX ADMIN — MAGNESIUM SULFATE HEPTAHYDRATE 2000 MG/HR: 40 INJECTION, SOLUTION INTRAVENOUS at 09:13

## 2021-11-16 RX ADMIN — INSULIN LISPRO 14 UNITS: 100 INJECTION, SOLUTION INTRAVENOUS; SUBCUTANEOUS at 13:03

## 2021-11-16 RX ADMIN — CLINDAMYCIN HYDROCHLORIDE 300 MG: 150 CAPSULE ORAL at 05:47

## 2021-11-16 RX ADMIN — INSULIN HUMAN 15 UNITS: 100 INJECTION, SUSPENSION SUBCUTANEOUS at 21:28

## 2021-11-16 RX ADMIN — DOCUSATE SODIUM 100 MG: 100 CAPSULE ORAL at 21:18

## 2021-11-16 RX ADMIN — OXYCODONE 10 MG: 5 TABLET ORAL at 17:44

## 2021-11-16 ASSESSMENT — PAIN SCALES - GENERAL
PAINLEVEL_OUTOF10: 7
PAINLEVEL_OUTOF10: 7
PAINLEVEL_OUTOF10: 3
PAINLEVEL_OUTOF10: 4
PAINLEVEL_OUTOF10: 2

## 2021-11-16 NOTE — PROGRESS NOTES
Spoke with Dr. Samantha Hawkins regarding patient blood sugar of 100. Per Dr. Samantha Hawkins hold NPH.

## 2021-11-16 NOTE — FLOWSHEET NOTE
Discharge prescriptions given to pt for colace, motrin, tylenol and oxycodone with instructions on use and side effects. See AVS.  Pt verbalized understanding of medications. Prescriptions sent to outpatient pharmacy to fill for patient. Will be delivered to patient tomorrow.

## 2021-11-16 NOTE — OP NOTE
[facility]                     [facility], [address] [place] [address]                                OPERATIVE REPORT    PATIENT NAME: Michelle Sheth        :        1990  MED REC NO:   [id number]                          ROOM:       OB05  ACCOUNT NO:   [de-identified]                           ADMIT DATE: 2021  PROVIDER: Zach Saenz MD    DATE OF PROCEDURE:  11/15/2021    PREOPERATIVE DIAGNOSES:  1. Severe preeclampsia 34 weeks. 2.  Gestational diabetes on insulin. 3.  Hypothyroidism. POSTOPERATIVE DIAGNOSES:  1. Severe preeclampsia 34 weeks. 2.  Gestational diabetes on insulin. 3.  Hypothyroidism. OPERATION PERFORMED:  Primary low transverse  section via  Pfannenstiel. SURGEON:  Zach Saenz MD    ANESTHESIA:  Spinal.    EBL:  1000. COMPLICATIONS:  None. INDICATIONS:  This is a 24-year-old G1 at 34 weeks who had been managed  inpatient for prior two weeks for preeclampsia with severe features. She had severe range of pressures on admission and required medication. She had a 24-hour urine protein that was over 300 mg. After  stabilization and a course of Celestone, she did well during her  hospital course and progressed to 34 weeks at which point primary   was performed for breech presentation. OPERATIVE PROCEDURE:  The patient was brought back to the operating  room. Spinal anesthesia was found to be adequate. She had a Blackman  catheter placed, prepped and draped in a normal sterile fashion. Panniculus retractor was then applied and the patient was then draped in  a normal sterile fashion. Skin incision was made with a scalpel and  carried through to the underlying fascia with Bovie. Bovie was then  used to incise the fascia at the midline and the fascial incision was  then extended laterally.   Once this was done, the superior aspect of the  fascia was grasped with Kochers and sharply and bluntly dissected off  the underlying rectus muscle. The muscles were then  at the  midline. Peritoneum was entered sharply and the peritoneal incision was  then extended superiorly and inferiorly. Haile protector was then  placed. The uterus palpated, fetus in breech presentation. Uterine  incision was then made and extended bluntly. Membranes were ruptured at  this point. The baby spontaneously _____ to cephalic presentation. Once the fluid had been expelled from the uterus, a vacuum was applied  to the fetus to allow flexion of the head. Once this was performed, the  fetus was easily delivered from cephalic position. Delayed cord  clamping was performed. A true knot was noted in the cord. The fetus  was handed off to awaiting pediatric nurse. The placenta was then  delivered with fundal massage and the uterus was cleared off all clots  and debris. The uterus was then closed in two layers and hemostatic. Haile protector was then removed after irrigation. The peritoneum was  reapproximated with 3-0 Vicryl, muscle was then reapproximated with  running 3-0 Vicryl. The fascia was inspected and found to be  hemostatic. The fascia was then closed with a running 0-Vicryl. The  subcutaneous tissue was inspected and found to be hemostatic. It was  closed in layers with 3-0 Vicryl. Skin was closed with 4-0 Monocryl and  a SHILO dressing was then applied without difficulty. The patient was  sent to PACU in stable condition. She will receive 24 hours of  magnesium given her preeclampsia. Further, we will recheck her labs in  the morning. She will receive Lovenox prophylaxis and clindamycin p.o.  as well.         Ami Bergeron MD    D: 11/15/2021 12:19:20       T: 11/15/2021 23:42:55     [id number]  Job#: [id number]     Doc#: [id number]    CC:

## 2021-11-16 NOTE — PROGRESS NOTES
Pt up to bathroom with 2 person stand by assist. Pt tolerated well, denies dizziness, ambulates with steady gait. Pt able to void ~400ml. Pt instructed on josé miguel care and demonstrated understanding. Pt back to chair with steady gait preparing to pump. Pt denies further needs at this time. Call light within reach, family at bedside. Will continue to monitor.

## 2021-11-16 NOTE — PROGRESS NOTES
Lactation Progress Note      Data:   Follow-up r/t mother pumping NB at NICU. Mother states her nipples are getting sore. Female visitor in room states mother is due to pump again at 3pm.       Action: Mother and visitor informed pumping should not cause soreness. LC dicussed cause of soreness could be related to flanges being too small or pumping strength set too high.  would recommend suction sitting be turned down and to call Nisha3 Andrews Coats or RN to the room to see if mother needs a large flange. LC offered to answer any other questions. Response: Mother agrees to call Denise Coats or RN to the room when she is pumping.

## 2021-11-16 NOTE — PROGRESS NOTES
[facility] post-partum note    Post-partum Day #1    Subjective:    Patient is doing better today. [facility] due to  delivery. She is tolerating regular fluid, whalen catheter is in place, pain is well controlled. She denies headache, visual changes or RUQ pain.   Lochia: Normal    Objective:  Vitals:    21 0550 21 0610 21 0650 21 0755   BP: 128/88  129/85 (!) 130/91   Pulse: 90  90 91   Resp:  18   Temp:   97.8 °F (36.6 °C) 97 °F (36.1 °C)   TempSrc:   Oral Oral   SpO2: 99%  99% 99%   Weight:  235 lb (106.6 kg)     Height:           Physical Examination:  Heart: RRR  Lungs- CTA-b  Appears well  Uterus: Firm, NT compression dressing intact  Calves: NT    Labs:    Recent Labs     11/15/21  0020 21  0601   WBC 12.7* 11.9*   HGB 14.1 12.4   HCT 41.6 36.8    177     Recent Labs     11/15/21  0020 21  0601    134*   K 4.1 4.5    103   CO2 22 22   BUN 12 10   CREATININE <0.5* <0.5*   CALCIUM 10.0 7.9*   AST 24 22   ALT 16 16         Current Facility-Administered Medications:     rho(D) immune globulin (HYPERRHO S/D) injection 300 mcg, 300 mcg, IntraMUSCular, Once, Ion Shankar MD    insulin NPH (HUMULIN N;NOVOLIN N) injection pen 15 Units, 15 Units, SubCUTAneous, Nightly, Silvestre Aragon MD    insulin lispro (1 Unit Dial) 14 Units, 14 Units, SubCUTAneous, BID WC, Silvestre Aragon MD    lactated ringers infusion, , IntraVENous, Continuous, Silvestre Aragon MD    sodium chloride flush 0.9 % injection 5-40 mL, 5-40 mL, IntraVENous, 2 times per day, Silvestre Aragon MD, 10 mL at 11/15/21 2203    sodium chloride flush 0.9 % injection 5-40 mL, 5-40 mL, IntraVENous, PRN, Silvestre Aragon MD    0.9 % sodium chloride infusion, 25 mL, IntraVENous, PRN, Nondjude Aragon MD, Last Rate: 75 mL/hr at 21, Rate Verify at 21    carboprost (HEMABATE) injection 250 mcg, 250 mcg, IntraMUSCular, PRN, Pratibha Brown MD    methylergonovine (METHERGINE) injection 200 mcg, 200 mcg, IntraMUSCular, PRN, Pratibha Brown MD    acetaminophen (TYLENOL) tablet 650 mg, 650 mg, Oral, Q4H PRN, Pratibha Brown MD    rho(D) immune globulin (HYPERRHO S/D) injection 300 mcg, 300 mcg, IntraMUSCular, Once, Pratibha Brown MD    ketorolac (TORADOL) injection 30 mg, 30 mg, IntraVENous, Q8H, Pratibha Brown MD, 30 mg at 11/15/21 1844    ibuprofen (ADVIL;MOTRIN) tablet 800 mg, 800 mg, Oral, 3 times per day, Pratibha Brown MD    acetaminophen (TYLENOL) tablet 1,000 mg, 1,000 mg, Oral, 3 times per day, Pratibha Brown MD, 1,000 mg at 11/16/21 0547    oxyCODONE (ROXICODONE) immediate release tablet 5 mg, 5 mg, Oral, Q4H PRN **OR** oxyCODONE (ROXICODONE) immediate release tablet 10 mg, 10 mg, Oral, Q4H PRN, Pratibha Brown MD    docusate sodium (COLACE) capsule 100 mg, 100 mg, Oral, BID, Pratibha Brown MD, 100 mg at 11/16/21 0902    lansinoh lanolin ointment, , Topical, Q1H PRN, Pratibha Brown MD    measles, mumps & rubella vaccine (MMR) injection 0.5 mL, 0.5 mL, SubCUTAneous, Prior to discharge, Pratibha Brown MD    Tetanus-Diphth-Acell Pertussis (BOOSTRIX) injection 0.5 mL, 0.5 mL, IntraMUSCular, Prior to discharge, Pratibha Brown MD    diphenhydrAMINE (BENADRYL) injection 25 mg, 25 mg, IntraVENous, Q6H PRN, Pratibha Brown MD    [facility]) chewable tablet 80 mg, 80 mg, Oral, Q6H PRN, Pratibha Brown MD    polyethylene glycol [facility]) packet 17 g, 17 g, Oral, Daily, Pratibha Brown MD    magnesium hydroxide (MILK OF MAGNESIA) 400 MG/5ML suspension 30 mL, 30 mL, Oral, Daily PRN, Pratibha Brown MD    ondansetron [facility]) injection 4 mg, 4 mg, IntraVENous, Q6H PRN, Pratibha Brown MD, 4 mg at 11/16/21 0034    enoxaparin (LOVENOX) injection 60 mg, 60 mg, SubCUTAneous, Daily, Pratibha Brown MD, 60 mg at 11/16/21 4645    prenatal vitamin tablet 1 tablet, 1 tablet, Oral, Daily, Kenyatta Lau MD, 1 tablet at 21 0902    clindamycin (CLEOCIN) capsule 300 mg, 300 mg, Oral, 3 times per day, Kenyatta Lau MD, 300 mg at 21 0547    magnesium sulfate (24416 mg/500mL infusion), 2,000 mg/hr, IntraVENous, Continuous, Kenyatta Lau MD, Last Rate: 50 mL/hr at 21 09, 2,000 mg/hr at 21 0913    oxytocin (PITOCIN) 30 units in 500 mL infusion, 87.3 niko-units/min, IntraVENous, Continuous PRN, Kenyatta Lau MD, Stopped at 11/15/21 1843    NIFEdipine (PROCARDIA XL) extended release tablet 30 mg, 30 mg, Oral, Daily, Kenyatta Lau MD, 30 mg at 21 0858    levothyroxine (SYNTHROID) tablet 300 mcg, 300 mcg, Oral, Daily, Kenyatta Lau MD, 300 mcg at 21 0547     Assessment/Plan:    28 y/o  s/p Primary LTCS at 34 wks for severe preeclampsia, GDM. Currently on MgS04 for 24 hrs- off at 1 pm today, GDM on NPH and Novolog- held secondary to normal BS, Lovenox for DVT ppx and history of hypothyroidism well controlled. BP's have been normal    Plan:  Continue routine postpartum/postop care  Ambulate TID  Pain meds as needed ATC  Continue MgS04 and closely watching blood sugar and blood pressure- Continue Nifedipine 30 mg xL  Post Partum: advance Postpartum care

## 2021-11-16 NOTE — ANESTHESIA POSTPROCEDURE EVALUATION
Department of Anesthesiology  Postprocedure Note    Patient: Prasad Rodriguez  MRN: [id number]  YOB: 1990  Date of evaluation: 2021  Time:  10:41 AM     Procedure Summary     Date: 11/15/21 Room / Location: [facility] OR [address] [place] [address]    Anesthesia Start: 1025 Anesthesia Stop: 1148    Procedures:        SECTION (N/A Abdomen)      Labor Analgesia Diagnosis:       (gestational hypertension)      (diabetes)    Surgeons: Gus Noel MD Responsible Provider: Jose Rafael Garcia MD    Anesthesia Type: regional, spinal ASA Status: 3 - Emergent          Anesthesia Type: regional, spinal    Michael Phase I: Michael Score: 9    Michael Phase II: Michael Score: 10    Last vitals: Reviewed and per EMR flowsheets. Anesthesia Post Evaluation    Patient location during evaluation: floor  Patient participation: complete - patient participated  Level of consciousness: awake and alert  Pain score: 1  Airway patency: patent  Nausea & Vomiting: no nausea and no vomiting  Complications: no  Cardiovascular status: blood pressure returned to baseline  Respiratory status: acceptable  Hydration status: stable  Comments: Patient s/p Spinal for C/S. Pt denies residual numbness post block. Patient is ambulating and voiding without difficulty. Patient denies back pain, headache, paresthesias, pruritus or n/v.  Spinal site is free of signs of infection.   Multimodal analgesia pain management approach

## 2021-11-16 NOTE — PROGRESS NOTES
Pt requested to go to SCN to see infant. Transport team for [facility] en route. Pt told the transport team would bring infant to bedside. Pt became verbally aggressive, cussing at Dr Mary Anne Burroughs and nurses while Dr Mary Anne Burroughs trying to explain why having a surgical bed in infant SCN room is not ideal at this time. Pt told Dr Mary Anne Burroughs to leave bedside.

## 2021-11-16 NOTE — PLAN OF CARE
Problem: Pain:  Goal: Pain level will decrease  Description: Pain level will decrease  Outcome: Ongoing     Problem: Fluid Volume:  Goal: Will maintain adequate fluid volume  Description: Will maintain adequate fluid volume  Outcome: Ongoing

## 2021-11-17 LAB
GLUCOSE BLD-MCNC: 109 MG/DL (ref 70–99)
GLUCOSE BLD-MCNC: 144 MG/DL (ref 70–99)
GLUCOSE BLD-MCNC: 152 MG/DL (ref 70–99)
GLUCOSE BLD-MCNC: 97 MG/DL (ref 70–99)
PERFORMED ON: ABNORMAL
PERFORMED ON: NORMAL

## 2021-11-17 PROCEDURE — 6370000000 HC RX 637 (ALT 250 FOR IP): Performed by: OBSTETRICS & GYNECOLOGY

## 2021-11-17 PROCEDURE — 1200000000 HC SEMI PRIVATE

## 2021-11-17 PROCEDURE — 6360000002 HC RX W HCPCS: Performed by: OBSTETRICS & GYNECOLOGY

## 2021-11-17 PROCEDURE — 2580000003 HC RX 258: Performed by: OBSTETRICS & GYNECOLOGY

## 2021-11-17 RX ORDER — NIFEDIPINE 30 MG/1
60 TABLET, EXTENDED RELEASE ORAL DAILY
Status: DISCONTINUED | OUTPATIENT
Start: 2021-11-17 | End: 2021-11-18 | Stop reason: HOSPADM

## 2021-11-17 RX ORDER — INSULIN LISPRO 100 [IU]/ML
10 INJECTION, SOLUTION INTRAVENOUS; SUBCUTANEOUS
Status: DISCONTINUED | OUTPATIENT
Start: 2021-11-17 | End: 2021-11-18 | Stop reason: HOSPADM

## 2021-11-17 RX ORDER — NIFEDIPINE 30 MG/1
60 TABLET, EXTENDED RELEASE ORAL DAILY
Status: DISCONTINUED | OUTPATIENT
Start: 2021-11-17 | End: 2021-11-17

## 2021-11-17 RX ORDER — INSULIN LISPRO 100 [IU]/ML
14 INJECTION, SOLUTION INTRAVENOUS; SUBCUTANEOUS 2 TIMES DAILY WITH MEALS
Status: DISCONTINUED | OUTPATIENT
Start: 2021-11-17 | End: 2021-11-18 | Stop reason: HOSPADM

## 2021-11-17 RX ADMIN — IBUPROFEN 800 MG: 800 TABLET, FILM COATED ORAL at 19:25

## 2021-11-17 RX ADMIN — PRENATAL VIT W/ FE FUMARATE-FA TAB 27-0.8 MG 1 TABLET: 27-0.8 TAB at 09:22

## 2021-11-17 RX ADMIN — ACETAMINOPHEN 1000 MG: 500 TABLET ORAL at 06:50

## 2021-11-17 RX ADMIN — NIFEDIPINE 60 MG: 30 TABLET, FILM COATED, EXTENDED RELEASE ORAL at 09:22

## 2021-11-17 RX ADMIN — ENOXAPARIN SODIUM 60 MG: 100 INJECTION SUBCUTANEOUS at 23:30

## 2021-11-17 RX ADMIN — CLINDAMYCIN HYDROCHLORIDE 300 MG: 150 CAPSULE ORAL at 23:30

## 2021-11-17 RX ADMIN — INSULIN LISPRO 14 UNITS: 100 INJECTION, SOLUTION INTRAVENOUS; SUBCUTANEOUS at 18:24

## 2021-11-17 RX ADMIN — CLINDAMYCIN HYDROCHLORIDE 300 MG: 150 CAPSULE ORAL at 15:43

## 2021-11-17 RX ADMIN — SODIUM CHLORIDE, PRESERVATIVE FREE 10 ML: 5 INJECTION INTRAVENOUS at 21:32

## 2021-11-17 RX ADMIN — DOCUSATE SODIUM 100 MG: 100 CAPSULE ORAL at 21:32

## 2021-11-17 RX ADMIN — CLINDAMYCIN HYDROCHLORIDE 300 MG: 150 CAPSULE ORAL at 06:50

## 2021-11-17 RX ADMIN — SODIUM CHLORIDE, PRESERVATIVE FREE 10 ML: 5 INJECTION INTRAVENOUS at 09:22

## 2021-11-17 RX ADMIN — ACETAMINOPHEN 1000 MG: 500 TABLET ORAL at 15:43

## 2021-11-17 RX ADMIN — INSULIN LISPRO 14 UNITS: 100 INJECTION, SOLUTION INTRAVENOUS; SUBCUTANEOUS at 13:21

## 2021-11-17 RX ADMIN — IBUPROFEN 800 MG: 800 TABLET, FILM COATED ORAL at 03:08

## 2021-11-17 RX ADMIN — IBUPROFEN 800 MG: 800 TABLET, FILM COATED ORAL at 11:37

## 2021-11-17 RX ADMIN — INSULIN LISPRO 10 UNITS: 100 INJECTION, SOLUTION INTRAVENOUS; SUBCUTANEOUS at 09:19

## 2021-11-17 RX ADMIN — OXYCODONE 5 MG: 5 TABLET ORAL at 18:23

## 2021-11-17 RX ADMIN — INSULIN HUMAN 15 UNITS: 100 INJECTION, SUSPENSION SUBCUTANEOUS at 21:33

## 2021-11-17 RX ADMIN — POLYETHYLENE GLYCOL 3350 17 G: 17 POWDER, FOR SOLUTION ORAL at 09:22

## 2021-11-17 RX ADMIN — ACETAMINOPHEN 1000 MG: 500 TABLET ORAL at 23:30

## 2021-11-17 RX ADMIN — LEVOTHYROXINE SODIUM 300 MCG: 0.1 TABLET ORAL at 06:49

## 2021-11-17 RX ADMIN — DOCUSATE SODIUM 100 MG: 100 CAPSULE ORAL at 09:22

## 2021-11-17 ASSESSMENT — PAIN SCALES - GENERAL
PAINLEVEL_OUTOF10: 2
PAINLEVEL_OUTOF10: 5
PAINLEVEL_OUTOF10: 3
PAINLEVEL_OUTOF10: 3
PAINLEVEL_OUTOF10: 4
PAINLEVEL_OUTOF10: 2
PAINLEVEL_OUTOF10: 4

## 2021-11-17 NOTE — FLOWSHEET NOTE
Procardia dose for 60 xr daily increase clarified with Dr. Clarisse Osorio. Insulin orders also changed by Dr. Clarisse Osorio to 10 units Lispro am, 14 units Lispro lunch and dinner. Meng Olivas, pharmacist entered changes. Patient aware and verbalized understanding.

## 2021-11-17 NOTE — PROGRESS NOTES
[facility] post-partum note    Post-partum Day #2    Subjective:    No complaints.   Baby at [address]: Normal    Objective:  Vitals:    11/16/21 2126 11/17/21 0130 11/17/21 0530 11/17/21 0708   BP: (!) 147/93 (!) 142/93 (!) 145/89    Pulse: 102 91 89    Resp:  16     Temp:  98.7 °F (37.1 °C)     TempSrc:  Oral     SpO2:  97%     Weight:    237 lb 1.6 oz (107.5 kg)   Height:           Physical Examination:  Appears well  Uterus: Firm, NT  Calves: NT    Labs:    Recent Labs     11/15/21  0020 11/16/21  0601   WBC 12.7* 11.9*   HGB 14.1 12.4   HCT 41.6 36.8    177     Recent Labs     11/15/21  0020 11/16/21  0601    134*   K 4.1 4.5    103   CO2 22 22   BUN 12 10   CREATININE <0.5* <0.5*   CALCIUM 10.0 7.9*   AST 24 22   ALT 16 16         Current Facility-Administered Medications:     NIFEdipine (PROCARDIA XL) extended release tablet 60 mg, 60 mg, Oral, Daily, Christine Echeverria MD    insulin lispro (1 Unit Dial) 10 Units, 10 Units, SubCUTAneous, Daily with breakfast, Boone Dykes MD    insulin lispro (1 Unit Dial) 14 Units, 14 Units, SubCUTAneous, BID WC, Boone Dykes MD    rho(D) immune globulin (HYPERRHO S/D) injection 300 mcg, 300 mcg, IntraMUSCular, Once, Renato Penn MD    insulin NPH (HUMULIN N;NOVOLIN N) injection pen 15 Units, 15 Units, SubCUTAneous, Nightly, Boone Dykes MD, 15 Units at 11/16/21 2128    lactated ringers infusion, , IntraVENous, Continuous, Boone Dykes MD    sodium chloride flush 0.9 % injection 5-40 mL, 5-40 mL, IntraVENous, 2 times per day, Boone Dykes MD, 10 mL at 11/16/21 2119    sodium chloride flush 0.9 % injection 5-40 mL, 5-40 mL, IntraVENous, PRN, Boone Dykes MD    0.9 % sodium chloride infusion, 25 mL, IntraVENous, PRN, Boone Dykes MD, Stopped at 11/16/21 1306    carboprost (HEMABATE) injection 250 mcg, 250 mcg, IntraMUSCular, PRN, Boone Dykes MD    methylergonovine (METHERGINE) injection 200 mcg, 200 mcg, IntraMUSCular, PRN, Abena Georges MD    acetaminophen (TYLENOL) tablet 650 mg, 650 mg, Oral, Q4H PRN, Abena Georges MD    ketorolac (TORADOL) injection 30 mg, 30 mg, IntraVENous, Q8H, Abena Georges MD, 30 mg at 11/15/21 1844    ibuprofen (ADVIL;MOTRIN) tablet 800 mg, 800 mg, Oral, 3 times per day, Abena Georges MD, 800 mg at 11/17/21 0308    acetaminophen (TYLENOL) tablet 1,000 mg, 1,000 mg, Oral, 3 times per day, Abena Georges MD, 1,000 mg at 11/17/21 4094    oxyCODONE (ROXICODONE) immediate release tablet 5 mg, 5 mg, Oral, Q4H PRN, 5 mg at 11/16/21 2310 **OR** oxyCODONE (ROXICODONE) immediate release tablet 10 mg, 10 mg, Oral, Q4H PRN, Abena Georges MD, 10 mg at 11/16/21 1744    docusate sodium (COLACE) capsule 100 mg, 100 mg, Oral, BID, Abena Georges MD, 100 mg at 11/16/21 2118    lansinoh lanolin ointment, , Topical, Q1H PRN, Abena Georges MD    measles, mumps & rubella vaccine (MMR) injection 0.5 mL, 0.5 mL, SubCUTAneous, Prior to discharge, Abena Georges MD    Tetanus-Diphth-Acell Pertussis (BOOSTRIX) injection 0.5 mL, 0.5 mL, IntraMUSCular, Prior to discharge, Abena Georges MD    diphenhydrAMINE (BENADRYL) injection 25 mg, 25 mg, IntraVENous, Q6H PRN, Abena Georges MD    [facility]) chewable tablet 80 mg, 80 mg, Oral, Q6H PRN, Abena Georges MD    polyethylene glycol [facility]) packet 17 g, 17 g, Oral, Daily, Abena Georges MD    magnesium hydroxide (MILK OF MAGNESIA) 400 MG/5ML suspension 30 mL, 30 mL, Oral, Daily PRN, Abena Georges MD    ondansetron [facility]) injection 4 mg, 4 mg, IntraVENous, Q6H PRN, Abena Georges MD, 4 mg at 11/16/21 0034    enoxaparin (LOVENOX) injection 60 mg, 60 mg, SubCUTAneous, Daily, Abena Georges MD, 60 mg at 11/16/21 2311    prenatal vitamin tablet 1 tablet, 1 tablet, Oral, Daily, Abena Georges MD, 1 tablet at 11/16/21 0902    clindamycin (CLEOCIN) capsule 300 mg, 300 mg, Oral, 3 times per day, Tata Frank MD, 300 mg at 11/17/21 0650    oxytocin (PITOCIN) 30 units in 500 mL infusion, 87.3 niko-units/min, IntraVENous, Continuous PRN, Tata Frank MD, Stopped at 11/15/21 1843    levothyroxine (SYNTHROID) tablet 300 mcg, 300 mcg, Oral, Daily, Tata Frank MD, 300 mcg at 11/17/21 0649   Yesterday's insulin:  14U lispro before lunch and dinner, 15U NPH at bedtime. Fasting today 98  Yesterday pp Breakfast 187, pp lunch and dinner within range  Will add 10U lispro before breakfast today and continue the other insulin,same dose. Assessment/Plan:      Post Partum: advance Postpartum care  Chronic HTN, s/p PIH. Was no no antihypertensive prior to pregnancy. S/p 24hr mgSo4  Increase to 60mg Procardia XL qam for elevated BP  Class B DM( was on metformin and Jardiance prior to pregnancy but pt desires to continue with insulin for now)  Lispro 10U before bkft, 14U before lunch and dinner,  15U nph at bedtime.

## 2021-11-17 NOTE — PLAN OF CARE
Problem: Pain:  Goal: Pain level will decrease  Description: Pain level will decrease  11/17/2021 1018 by Eli Dao RN  Outcome: Ongoing     Problem: Pain:  Goal: Control of acute pain  Description: Control of acute pain  11/17/2021 1018 by Eli Dao RN  Outcome: Ongoing     Problem: Coping:  Goal: Coping behaviors will improve  Description: Coping behaviors will improve  11/17/2021 1018 by Eli Dao RN  Outcome: Ongoing     Problem: Coping:  Goal: Ability to verbalize feelings will improve  Description: Ability to verbalize feelings will improve  11/17/2021 1018 by Eli Dao RN  Outcome: Ongoing     Problem: Fluid Volume:  Goal: Will maintain adequate fluid volume  Description: Will maintain adequate fluid volume  11/17/2021 1018 by Eli Dao RN  Outcome: Ongoing     Problem: Fluid Volume:  Goal: Ability to achieve and maintain adequate urine output will improve  Description: Ability to achieve and maintain adequate urine output will improve  Outcome: Ongoing     Problem: Fluid Volume:  Goal: Peripheral tissue perfusion will improve  Description: Peripheral tissue perfusion will improve  Outcome: Ongoing     Problem: Fluid Volume:  Goal: Ability to achieve and maintain adequate urine output will improve  Description: Ability to achieve and maintain adequate urine output will improve  Outcome: Ongoing     Problem: Physical Regulation:  Goal: Risk for medication side effects will decrease  Description: Risk for medication side effects will decrease  11/17/2021 1018 by Eli Dao RN  Outcome: Ongoing     Problem: Physical Regulation:  Goal: Will remain free of preeclampsia complications  Description: Will remain free of preeclampsia complications  43/97/0455 1018 by Eli Dao RN  Outcome: Ongoing     Problem: Physical Regulation:  Goal: Signs of adequate cerebral perfusion will increase  Description: Signs of adequate cerebral perfusion will increase  11/17/2021 1018 by Cris Dietrich RN  Outcome: Ongoing

## 2021-11-17 NOTE — PLAN OF CARE
Problem: Pain:  Goal: Pain level will decrease  Outcome: Ongoing  Goal: Control of acute pain  Outcome: Ongoing  Goal: Control of chronic pain  Outcome: Ongoing     Problem: Coping:  Goal: Coping behaviors will improve  Outcome: Ongoing  Goal: Ability to verbalize feelings will improve  Outcome: Ongoing     Problem: Fluid Volume:  Goal: Will maintain adequate fluid volume  Outcome: Ongoing     Problem: Physical Regulation:  Goal: Risk for medication side effects will decrease  Outcome: Ongoing  Goal: Will remain free of preeclampsia complications  Outcome: Ongoing  Goal: Signs of adequate cerebral perfusion will increase  Outcome: Ongoing

## 2021-11-18 VITALS
RESPIRATION RATE: 18 BRPM | BODY MASS INDEX: 43.28 KG/M2 | DIASTOLIC BLOOD PRESSURE: 93 MMHG | OXYGEN SATURATION: 98 % | SYSTOLIC BLOOD PRESSURE: 131 MMHG | WEIGHT: 235.2 LBS | HEART RATE: 91 BPM | TEMPERATURE: 97.2 F | HEIGHT: 62 IN

## 2021-11-18 PROBLEM — Z98.891 STATUS POST PRIMARY LOW TRANSVERSE CESAREAN SECTION: Status: ACTIVE | Noted: 2021-11-18

## 2021-11-18 PROBLEM — O11.9 CHRONIC HYPERTENSION WITH SUPERIMPOSED PRE-ECLAMPSIA: Status: ACTIVE | Noted: 2021-11-18

## 2021-11-18 LAB
GLUCOSE BLD-MCNC: 145 MG/DL (ref 70–99)
GLUCOSE BLD-MCNC: 89 MG/DL (ref 70–99)
PERFORMED ON: ABNORMAL
PERFORMED ON: NORMAL

## 2021-11-18 PROCEDURE — 6370000000 HC RX 637 (ALT 250 FOR IP): Performed by: OBSTETRICS & GYNECOLOGY

## 2021-11-18 PROCEDURE — G0008 ADMIN INFLUENZA VIRUS VAC: HCPCS | Performed by: OBSTETRICS & GYNECOLOGY

## 2021-11-18 PROCEDURE — 2580000003 HC RX 258: Performed by: OBSTETRICS & GYNECOLOGY

## 2021-11-18 PROCEDURE — 90686 IIV4 VACC NO PRSV 0.5 ML IM: CPT | Performed by: OBSTETRICS & GYNECOLOGY

## 2021-11-18 PROCEDURE — 6360000002 HC RX W HCPCS: Performed by: OBSTETRICS & GYNECOLOGY

## 2021-11-18 RX ORDER — INSULIN LISPRO 100 [IU]/ML
10 INJECTION, SOLUTION INTRAVENOUS; SUBCUTANEOUS
Qty: 10 ML | Refills: 2 | Status: SHIPPED | OUTPATIENT
Start: 2021-11-18

## 2021-11-18 RX ORDER — NIFEDIPINE 30 MG/1
60 TABLET, EXTENDED RELEASE ORAL DAILY
Qty: 30 TABLET | Refills: 3 | Status: SHIPPED | OUTPATIENT
Start: 2021-11-19

## 2021-11-18 RX ORDER — LEVOTHYROXINE SODIUM 300 UG/1
300 TABLET ORAL DAILY
Qty: 30 TABLET | Refills: 3 | Status: SHIPPED | OUTPATIENT
Start: 2021-11-19

## 2021-11-18 RX ADMIN — POLYETHYLENE GLYCOL 3350 17 G: 17 POWDER, FOR SOLUTION ORAL at 08:13

## 2021-11-18 RX ADMIN — LEVOTHYROXINE SODIUM 300 MCG: 0.1 TABLET ORAL at 06:47

## 2021-11-18 RX ADMIN — NIFEDIPINE 60 MG: 30 TABLET, FILM COATED, EXTENDED RELEASE ORAL at 09:33

## 2021-11-18 RX ADMIN — CLINDAMYCIN HYDROCHLORIDE 300 MG: 150 CAPSULE ORAL at 09:34

## 2021-11-18 RX ADMIN — OXYCODONE 5 MG: 5 TABLET ORAL at 06:47

## 2021-11-18 RX ADMIN — IBUPROFEN 800 MG: 800 TABLET, FILM COATED ORAL at 04:23

## 2021-11-18 RX ADMIN — PRENATAL VIT W/ FE FUMARATE-FA TAB 27-0.8 MG 1 TABLET: 27-0.8 TAB at 08:14

## 2021-11-18 RX ADMIN — DOCUSATE SODIUM 100 MG: 100 CAPSULE ORAL at 08:14

## 2021-11-18 RX ADMIN — INFLUENZA A VIRUS A/VICTORIA/2570/2019 IVR-215 (H1N1) ANTIGEN (PROPIOLACTONE INACTIVATED), INFLUENZA A VIRUS A/CAMBODIA/E0826360/2020 IVR-224 (H3N2) ANTIGEN (PROPIOLACTONE INACTIVATED), INFLUENZA B VIRUS B/VICTORIA/705/2018 BVR-11 ANTIGEN (PROPIOLACTONE INACTIVATED), INFLUENZA B VIRUS B/PHUKET/3073/2013 BVR-1B ANTIGEN (PROPIOLACTONE INACTIVATED) 0.5 ML: 15; 15; 15; 15 INJECTION, SUSPENSION INTRAMUSCULAR at 10:47

## 2021-11-18 RX ADMIN — ACETAMINOPHEN 1000 MG: 500 TABLET ORAL at 08:14

## 2021-11-18 RX ADMIN — IBUPROFEN 800 MG: 800 TABLET, FILM COATED ORAL at 12:15

## 2021-11-18 RX ADMIN — SODIUM CHLORIDE, PRESERVATIVE FREE 10 ML: 5 INJECTION INTRAVENOUS at 08:14

## 2021-11-18 RX ADMIN — INSULIN LISPRO 10 UNITS: 100 INJECTION, SOLUTION INTRAVENOUS; SUBCUTANEOUS at 09:35

## 2021-11-18 ASSESSMENT — PAIN SCALES - GENERAL
PAINLEVEL_OUTOF10: 4
PAINLEVEL_OUTOF10: 6
PAINLEVEL_OUTOF10: 2
PAINLEVEL_OUTOF10: 2

## 2021-11-18 NOTE — PLAN OF CARE
Problem: Pain:  Goal: Pain level will decrease  11/17/2021 2355 by Cipriano Williamson RN  Outcome: Ongoing  11/17/2021 1018 by Eboni Ornelas RN  Outcome: Ongoing  Goal: Control of acute pain  11/17/2021 1018 by Eboni Ornelas RN  Outcome: Ongoing     Problem: Coping:  Goal: Coping behaviors will improve  11/17/2021 2355 by Cipriano Williamson RN  Outcome: Ongoing  11/17/2021 1018 by Eboni Ornelas RN  Outcome: Ongoing  Goal: Ability to verbalize feelings will improve  11/17/2021 2355 by Cipriano Williamson RN  Outcome: Ongoing  11/17/2021 1018 by Eboni Ornelas RN  Outcome: Ongoing     Problem: Fluid Volume:  Goal: Peripheral tissue perfusion will improve  11/17/2021 2355 by Cipriano Williamson RN  Outcome: Ongoing  11/17/2021 1018 by Eboni Ornelas RN  Outcome: Ongoing     Problem: Physical Regulation:  Goal: Risk for medication side effects will decrease  11/17/2021 2355 by Cipriano Williamson RN  Outcome: Ongoing  11/17/2021 1018 by Eboni Ornelas RN  Outcome: Ongoing  Goal: Will remain free of preeclampsia complications  35/22/0630 2355 by Cipriano Williamson RN  Outcome: Ongoing  11/17/2021 1018 by Eboni Ornelas RN  Outcome: Ongoing  Goal: Signs of adequate cerebral perfusion will increase  11/17/2021 2355 by Cipriano Williamson RN  Outcome: Ongoing  11/17/2021 1018 by Eboni Ornelas RN  Outcome: Ongoing

## 2021-11-18 NOTE — FLOWSHEET NOTE
Assessment and meds given per this [place] nursing instructor. Primary nurse notified of care completed and vitals.

## 2021-11-18 NOTE — PROGRESS NOTES
Subjective:     Postpartum Day 3:  Delivery    The patient feels well. The patient denies emotional concerns. Pain is well controlled with current medications. The baby is at [facility]. Patient is pumping q2hr. Urinary output is adequate. The patient is ambulating well. The patient is tolerating a normal diet. Flatus has been passed. Objective:      Patient Vitals for the past 8 hrs:   BP Temp Temp src Pulse   17 0841 130/86 97 °F (36.1 °C) Oral 62     General:    alert, appears stated age and cooperative       Lochia:  appropriate   Uterine Fundus:   firm   Incision:  Carol dressing covering. Lower extremities:  No calf tenderness, no edema.      Current Facility-Administered Medications   Medication Dose Route Frequency Provider Last Rate Last Admin    NIFEdipine (PROCARDIA XL) extended release tablet 60 mg  60 mg Oral Daily Wale Zeng MD   60 mg at 21 0933    insulin lispro (1 Unit Dial) 10 Units  10 Units SubCUTAneous Daily with breakfast Kenyatta Lau MD   10 Units at 21 0935    insulin lispro (1 Unit Dial) 14 Units  14 Units SubCUTAneous BID WC Kenyatta Lau MD   14 Units at 21 1824    rho(D) immune globulin (HYPERRHO S/D) injection 300 mcg  300 mcg IntraMUSCular Once Berry De Los Santos MD        insulin NPH (HUMULIN N;NOVOLIN N) injection pen 15 Units  15 Units SubCUTAneous Nightly Kenyatta Lau MD   15 Units at 21 2133    lactated ringers infusion   IntraVENous Continuous Kenyatta Lau MD        sodium chloride flush 0.9 % injection 5-40 mL  5-40 mL IntraVENous 2 times per day Kenyatta Lau MD   10 mL at 21 5901    sodium chloride flush 0.9 % injection 5-40 mL  5-40 mL IntraVENous PRN Kenyatta Lau MD        0.9 % sodium chloride infusion  25 mL IntraVENous PRN Kenyatta Lau MD   Stopped at 21 1306    carboprost (HEMABATE) injection 250 mcg  250 mcg IntraMUSCular PRN Kenyatta Lau MD        methylergonovine (METHERGINE) injection 200 mcg  200 mcg IntraMUSCular PRN Kate Leonard MD        acetaminophen (TYLENOL) tablet 650 mg  650 mg Oral Q4H PRN Kate Leonard MD        ibuprofen (ADVIL;MOTRIN) tablet 800 mg  800 mg Oral 3 times per day Kate Leonard MD   800 mg at 11/18/21 0423    acetaminophen (TYLENOL) tablet 1,000 mg  1,000 mg Oral 3 times per day Kate Leonard MD   1,000 mg at 11/18/21 3794    oxyCODONE (ROXICODONE) immediate release tablet 5 mg  5 mg Oral Q4H PRN Kate Leonard MD   5 mg at 11/18/21 0915    Or    oxyCODONE (ROXICODONE) immediate release tablet 10 mg  10 mg Oral Q4H PRN Kate Leonard MD   10 mg at 11/16/21 1744    docusate sodium (COLACE) capsule 100 mg  100 mg Oral BID Kate Leonard MD   100 mg at 11/18/21 2630    lansinoh lanolin ointment   Topical Q1H PRN Kate Leonard MD        measles, mumps & rubella vaccine (MMR) injection 0.5 mL  0.5 mL SubCUTAneous Prior to discharge Kate Leonard MD        Tetanus-Diphth-Acell Pertussis (BOOSTRIX) injection 0.5 mL  0.5 mL IntraMUSCular Prior to discharge Kate Leonard MD        diphenhydrAMINE (BENADRYL) injection 25 mg  25 mg IntraVENous Q6H PRN Kate Leonard MD        [facility]) chewable tablet 80 mg  80 mg Oral Q6H PRN Kate Leonard MD        polyethylene glycol [facility]) packet 17 g  17 g Oral Daily Kate Leonard MD   17 g at 11/18/21 0813    magnesium hydroxide (MILK OF MAGNESIA) 400 MG/5ML suspension 30 mL  30 mL Oral Daily PRN Kate Leonard MD        ondansetron [facility]) injection 4 mg  4 mg IntraVENous Q6H PRN Kate Leonard MD   4 mg at 11/16/21 0034    enoxaparin (LOVENOX) injection 60 mg  60 mg SubCUTAneous Daily Kate Leonard MD   60 mg at 11/17/21 2330    prenatal vitamin tablet 1 tablet  1 tablet Oral Daily Kate Leonard MD   1 tablet at 11/18/21 0838    clindamycin (CLEOCIN) capsule 300 mg  300 mg Oral 3 times per day Irina Reaves MD Yehuda   300 mg at 21 0934    levothyroxine (SYNTHROID) tablet 300 mcg  300 mcg Oral Daily Abena Georges MD   300 mcg at 21 3965       Assessment:     32yo female,  sp  at [address] due to preeclampsia with severe features. Fasting BG this am was 89, postprandial after breakfast was 145. BP this am was 131/93. Plan:     1. Postpartum - Discharge home with standard precautions and return to clinic @ 1 week post delivery (). 2. Class B DM - was on metformin and Jaridiance prior to pregnancy, but pt desires to continue insulin for now. Current insulin regiment starting yesterday - 10U lispro before breakfast, 14U lispro before lunch and dinner, 15U NPH at bedtime. 3.  Chronic HTN - was not on antihypertensives prior to pregnancy. Currently on 60mg Procardia XL qam, continue at home and monitor home blood pressures. 4. Hypothyroidism - continue Synthyroid 300mg daily, f/u with family physician.

## 2021-11-18 NOTE — PROGRESS NOTES
Subjective:     Postpartum Day 3:  Delivery    The patient feels well. The patient denies emotional concerns. Pain is well controlled with current medications. The baby is at [facility] - extubated and improving. Baby is feeding via pumping. Urinary output is adequate. The patient is ambulating well. The patient is tolerating a normal diet. Flatus has been passed. + BM    Objective:      Patient Vitals for the past 8 hrs:   BP Temp Temp src Pulse   17 0841 130/86 97 °F (36.1 °C) Oral 62     General:    alert, appears stated age, cooperative and no distress       Lochia:  appropriate   Uterine Fundus:   firm   Incision:  SHILO dressing in place   DVT Evaluation:  No evidence of DVT seen on physical exam.       CBC:   Lab Results   Component Value Date    WBC 11.9 2021    RBC 4.58 2021    HGB 12.4 2021    HCT 36.8 2021    MCV 80.5 2021    MCH 27.1 2021    MCHC 33.6 2021    RDW 14.3 2021     2021    MPV 10.1 2021     Results for Ewa Osman (MRN [id number]) as of 2021 11:46   Ref. Range 2021 10:32 2021 14:36 2021 19:29 2021 06:50 2021 10:56   POC Glucose Latest Ref Range: 70 - 99 mg/dl 144 (H) 109 (H) 152 (H) 89 145 (H)     Assessment:     Status post  section. Doing well postoperatively. Postoperative course complicated by hypertension managed with nifedipine 60 mg qd  Class B DM controlled with insulin   Hypothyroid- stableonmeds    Plan:     Discharge home with standard precautions and return to office in 4 d for incision check. Monitor home BP. Continue current insulin and levothyroxine doses. Encouraged getting plenty of rest, good nutrition and hydration and ambulation.

## 2021-11-18 NOTE — DISCHARGE SUMMARY
Obstetrical Discharge Form    Admission date: 11/3/21    Discharge date: 21    Admission diagnosis: 32w2d IUP, Class B DM, chronic hypertension with severe hypertension, fetal macrosomia, hypothyroid, morbid obesity, breech presentation    Discharge diagnosis: S/P low transverse  section, chronic hypertension with superimposed preeclampsia, class B DM, morbid obesity    Hospital course: She was admitted with severe range hypertension, had recent USD with EFW > 99% 3100 g at 32 weeks. She was treated with magnesium sulfate FOR 48 hours , required procardia 90 mg and 30 mg bid for HTN and continued insulin for DM . She received 2 doses of celestone 11/3 & 21. Her levothyroxine was increased form 275 to 300 mcg. Delivery was planned for 34 weeks. Postpartum her BP was managed with 60 mg procardia and her insulin was reduced by half for good glucose control.  .     Gestational Age: 31w0d    Date of Delivery: 21      Type of Delivery: Primary low transverse  section for breech presentation    Delivered By: Star Coyne     Baby:      Information for the patient's :  Zohreh Hartley [[id number]]   APGAR One: 7     Information for the patient's :  Zohreh Hartley [[id number]]   APGAR Five: 8     Information for the patient's :  Zohreh Hartley [[id number]]   Birth Weight: 7 lb 13.9 oz (3.57 kg)       Anesthesia: Spinal    Intrapartum complications: None    Postpartum complications: baby was transferred to [facility] for higher level of care    Discharge Medication:      Medication List      START taking these medications    docusate sodium 100 MG capsule  Commonly known as: Colace  Take 1 capsule by mouth daily as needed for Constipation     ibuprofen 800 MG tablet  Commonly known as: ADVIL;MOTRIN  Take 1 tablet by mouth every 6 hours as needed for Pain     insulin lispro (1 Unit Dial) 100 UNIT/ML Sopn  Inject 10 Units into the skin 3 times daily (before meals) 10 units SQ before breakfast then 14 units sq before lunch and dinner  Replaces: insulin lispro 100 UNIT/ML injection vial     levothyroxine 300 MCG tablet  Commonly known as: SYNTHROID  Take 1 tablet by mouth Daily  Start taking on: November 19, 2021  Replaces: Levothyroxine Sodium 137 MCG Caps     NIFEdipine 30 MG extended release tablet  Commonly known as: PROCARDIA XL  Take 2 tablets by mouth daily  Start taking on: November 19, 2021     oxyCODONE 5 MG capsule  Take 1 capsule by mouth every 4 hours as needed for Pain for up to 7 days.         CHANGE how you take these medications    insulin  UNIT/ML injection pen  Commonly known as: HUMULIN N;NOVOLIN N  Inject 15 Units into the skin nightly Indications: 60u breakfast 62u bedtime  What changed:   · how much to take  · when to take this  · additional instructions        CONTINUE taking these medications    acetaminophen 500 MG tablet  Commonly known as: TYLENOL  Take 2 tablets by mouth 3 times daily     PRENATAL VITAMIN PO        STOP taking these medications    aspirin 81 MG chewable tablet     betamethasone dipropionate 0.05 % ointment  Commonly known as: DIPROLENE     cetirizine 10 MG tablet  Commonly known as: ZYRTEC     FreeStyle Neena 14 Day Lamar Huong     insulin lispro 100 UNIT/ML injection vial  Commonly known as: HUMALOG  Replaced by: insulin lispro (1 Unit Dial) 100 UNIT/ML Sopn     Levothyroxine Sodium 137 MCG Caps  Replaced by: levothyroxine 300 MCG tablet           Where to Get Your Medications      You can get these medications from any pharmacy    Bring a paper prescription for each of these medications  · acetaminophen 500 MG tablet  · docusate sodium 100 MG capsule  · ibuprofen 800 MG tablet  · insulin lispro (1 Unit Dial) 100 UNIT/ML Sopn  · insulin  UNIT/ML injection pen  · levothyroxine 300 MCG tablet  · NIFEdipine 30 MG extended release tablet  · oxyCODONE 5 MG capsule         Discharge Condition: good    Discharge Date: 21    PLAN:  Follow up in 4 days  for routine PP visit  All questions answered  D/C summary begun at delivery for D/C planning purposes, any delay in discharge from ordered D/C date due to  factors.

## 2021-11-18 NOTE — FLOWSHEET NOTE
Dr. Garner Fails informed of pt's one hour postprandial blood sugar after breakfast of 145. MD voiced understanding and no new orders received.

## 2021-11-18 NOTE — PROGRESS NOTES
CLINICAL PHARMACY NOTE: MEDS TO BEDS    Total # of Prescriptions Filled: 4   The following medications were delivered to the patient:  · Oxycodone  · Acetaminophen  · Ibuprofen  · Docusate    Additional Documentation:    Delivered to patient    Obey Pat

## 2023-02-23 RX ORDER — LISINOPRIL 10 MG/1
10 TABLET ORAL DAILY
COMMUNITY

## 2023-02-23 RX ORDER — ATORVASTATIN CALCIUM 20 MG/1
TABLET, FILM COATED ORAL NIGHTLY
COMMUNITY
Start: 2021-01-08

## 2023-02-23 RX ORDER — ALLOPURINOL 100 MG/1
100 TABLET ORAL NIGHTLY
COMMUNITY

## 2023-02-23 NOTE — PROGRESS NOTES
Called and left message for [facility] at Dr. Giovany Nava office concerning patient's IV antibiotic allergy. Requested that she fax new orders for pre op IV antibiotics.

## 2023-02-23 NOTE — PROGRESS NOTES
Name_______________________________________Printed:____________________  Date and time of surgery___3/1/23  1100_____________________Arrival Time:____0930____________   1. The instructions given regarding when and if a patient needs to stop oral intake prior to surgery varies. Follow the specific instructions you were given                  __x_Nothing to eat or to drink after Midnight the night before.                   ____Carbo loading or instructions will be given to select patients-if you have been given those instructions -please do the following                           The evening before your surgery after dinner before midnight drink 40 ounces of gatorade. If you are diabetic use sugar free. The morning of surgery drink 40 ounces of water. This needs to be finished 3 hours prior to your surgery start time. 2. Take the following pills with a small sip of water on the morning of surgery__levothyroxine_________________________________________________                  Do not take blood pressure medications ending in pril or sartan the samia prior to surgery or the morning of surgery. Dr Khalif Myers patient are not to take any medications the AM of surgery. 3. Aspirin, Ibuprofen, Advil, Naproxen, Vitamin E and other Anti-inflammatory products and supplements should be stopped for 5 -7days before surgery or as directed by your physician. 4. Check with your Doctor regarding stopping Plavix, Coumadin,Eliquis, Lovenox,Effient,Pradaxa,Xarelto, Fragmin or other blood thinners and follow their instructions. 5. Do not smoke, and do not drink any alcoholic beverages 24 hours prior to surgery. This includes NA Beer. Refrain from the usage of any recreational drugs. 6. You may brush your teeth and gargle the morning of surgery. DO NOT SWALLOW WATER   7. You MUST make arrangements for a responsible adult to stay on site while you are here and take you home after your surgery.  You will not be allowed to leave alone or drive yourself home. It is strongly suggested someone stay with you the first 24 hrs. Your surgery will be cancelled if you do not have a ride home. 8. A parent/legal guardian must accompany a child scheduled for surgery and plan to stay at the hospital until the child is discharged. Please do not bring other children with you. 9. Please wear simple, loose fitting clothing to the hospital.  Layton Blades not bring valuables (money, credit cards, checkbooks, etc.) Do not wear any makeup (including no eye makeup) or nail polish on your fingers or toes. 10. DO NOT wear any jewelry or piercings on day of surgery. All body piercing jewelry must be removed. 11. If you have ___dentures, they will be removed before going to the OR; we will provide you a container. If you wear ___contact lenses or _x__glasses, they will be removed; please bring a case for them. 12. Please see your family doctor/pediatrician for a history & physical and/or concerning medications. Bring any test results/reports from your physician's office. PCP__________________Phone___________H&P Appt. Date________             13 If you  have a Living Will and Durable Power of  for Healthcare, please bring in a copy. 15. Notify your Surgeon if you develop any illness between now and surgery  time, cough, cold, fever, sore throat, nausea, vomiting, etc.  Please notify your surgeon if you experience dizziness, shortness of breath or blurred vision between now & the time of your surgery             15. DO NOT shave your operative site 96 hours prior to surgery. For face & neck surgery, men may use an electric razor 48 hours prior to surgery. 16. Shower the night before or morning of surgery using an antibacterial soap or as you have been instructed. 17. To provide excellent care visitors will be limited to one in the room at any given time.              18.  Please bring picture ID and insurance card. 19.  Visit our web site for additional information:  [email or web address]              20.During flu season no children under the age of 15 are permitted in the hospital for the safety of all patients. 21. If you take a long acting insulin in the evening only  take half of your usual  dose the night  before your procedure              22. If you use a c-pap please bring DOS if staying overnight,             23.For your convenience [id number] has a pharmacy on site to fill your prescriptions. 24. If you use oxygen and have a portable tank please bring it  with you the DOS             25. Bring a complete list of all your medications with name and dose include any supplements. 26. Other__________________________________________   *Please call pre admission testing if you any further questions   [address]. [facility]  [phone]   [address] [place] [address]       VISITOR POLICY(subject to change)    Current policy is 2 visitors per patient. No children. Mask is  at the discretion of the facility. Visiting hours are 8a-8p. Overnight visitors will be at the discretion of the nurse. All policies subject to change. All above information reviewed with patient in person or by phone. Patient verbalizes understanding. All questions and concerns addressed.                                                                                                  Patient/Rep_patient___________________                                                                                                                                    PRE OP INSTRUCTIONS

## 2023-02-28 ENCOUNTER — ANESTHESIA EVENT (OUTPATIENT)
Dept: OPERATING ROOM | Age: 33
End: 2023-02-28
Payer: COMMERCIAL

## 2023-03-01 ENCOUNTER — ANESTHESIA (OUTPATIENT)
Dept: OPERATING ROOM | Age: 33
End: 2023-03-01
Payer: COMMERCIAL

## 2023-03-01 ENCOUNTER — HOSPITAL ENCOUNTER (OUTPATIENT)
Age: 33
Setting detail: OUTPATIENT SURGERY
Discharge: HOME | End: 2023-03-01
Attending: OBSTETRICS & GYNECOLOGY | Admitting: OBSTETRICS & GYNECOLOGY
Payer: COMMERCIAL

## 2023-03-01 VITALS
BODY MASS INDEX: 41.96 KG/M2 | WEIGHT: 228 LBS | HEIGHT: 62 IN | HEART RATE: 88 BPM | TEMPERATURE: 97.4 F | DIASTOLIC BLOOD PRESSURE: 79 MMHG | RESPIRATION RATE: 16 BRPM | OXYGEN SATURATION: 94 % | SYSTOLIC BLOOD PRESSURE: 123 MMHG

## 2023-03-01 DIAGNOSIS — N90.60 LABIAL HYPERTROPHY: ICD-10-CM

## 2023-03-01 DIAGNOSIS — Z98.891 STATUS POST PRIMARY LOW TRANSVERSE CESAREAN SECTION: Primary | ICD-10-CM

## 2023-03-01 DIAGNOSIS — O11.9 CHRONIC HYPERTENSION WITH SUPERIMPOSED PRE-ECLAMPSIA: ICD-10-CM

## 2023-03-01 LAB
ABO/RH: NORMAL
ANION GAP SERPL CALCULATED.3IONS-SCNC: 16 MMOL/L (ref 3–16)
ANTIBODY SCREEN: NORMAL
BUN BLDV-MCNC: 8 MG/DL (ref 7–20)
CALCIUM SERPL-MCNC: 9.3 MG/DL (ref 8.3–10.6)
CHLORIDE BLD-SCNC: 99 MMOL/L (ref 99–110)
CO2: 24 MMOL/L (ref 21–32)
CREAT SERPL-MCNC: 0.5 MG/DL (ref 0.6–1.1)
GFR SERPL CREATININE-BSD FRML MDRD: >60 ML/MIN/{1.73_M2}
GLUCOSE BLD-MCNC: 150 MG/DL (ref 70–99)
GLUCOSE BLD-MCNC: 158 MG/DL (ref 70–99)
HCG(URINE) PREGNANCY TEST: NEGATIVE
HCT VFR BLD CALC: 42.6 % (ref 36–48)
HEMOGLOBIN: 14.5 G/DL (ref 12–16)
MCH RBC QN AUTO: 27.3 PG (ref 26–34)
MCHC RBC AUTO-ENTMCNC: 34.1 G/DL (ref 31–36)
MCV RBC AUTO: 80.2 FL (ref 80–100)
PDW BLD-RTO: 14.1 % (ref 12.4–15.4)
PERFORMED ON: ABNORMAL
PLATELET # BLD: 255 K/UL (ref 135–450)
PMV BLD AUTO: 9.2 FL (ref 5–10.5)
POTASSIUM SERPL-SCNC: 4.2 MMOL/L (ref 3.5–5.1)
RBC # BLD: 5.31 M/UL (ref 4–5.2)
SODIUM BLD-SCNC: 139 MMOL/L (ref 136–145)
WBC # BLD: 10.1 K/UL (ref 4–11)

## 2023-03-01 PROCEDURE — 85027 COMPLETE CBC AUTOMATED: CPT

## 2023-03-01 PROCEDURE — 2709999900 HC NON-CHARGEABLE SUPPLY: Performed by: OBSTETRICS & GYNECOLOGY

## 2023-03-01 PROCEDURE — 7100000000 HC PACU RECOVERY - FIRST 15 MIN: Performed by: OBSTETRICS & GYNECOLOGY

## 2023-03-01 PROCEDURE — 6360000002 HC RX W HCPCS: Performed by: ANESTHESIOLOGY

## 2023-03-01 PROCEDURE — 3700000000 HC ANESTHESIA ATTENDED CARE: Performed by: OBSTETRICS & GYNECOLOGY

## 2023-03-01 PROCEDURE — 86901 BLOOD TYPING SEROLOGIC RH(D): CPT

## 2023-03-01 PROCEDURE — 2580000003 HC RX 258: Performed by: OBSTETRICS & GYNECOLOGY

## 2023-03-01 PROCEDURE — 7100000010 HC PHASE II RECOVERY - FIRST 15 MIN: Performed by: OBSTETRICS & GYNECOLOGY

## 2023-03-01 PROCEDURE — 86850 RBC ANTIBODY SCREEN: CPT

## 2023-03-01 PROCEDURE — 84703 CHORIONIC GONADOTROPIN ASSAY: CPT

## 2023-03-01 PROCEDURE — 3600000013 HC SURGERY LEVEL 3 ADDTL 15MIN: Performed by: OBSTETRICS & GYNECOLOGY

## 2023-03-01 PROCEDURE — 3600000003 HC SURGERY LEVEL 3 BASE: Performed by: OBSTETRICS & GYNECOLOGY

## 2023-03-01 PROCEDURE — 6370000000 HC RX 637 (ALT 250 FOR IP): Performed by: ANESTHESIOLOGY

## 2023-03-01 PROCEDURE — 2500000003 HC RX 250 WO HCPCS: Performed by: OBSTETRICS & GYNECOLOGY

## 2023-03-01 PROCEDURE — 3700000001 HC ADD 15 MINUTES (ANESTHESIA): Performed by: OBSTETRICS & GYNECOLOGY

## 2023-03-01 PROCEDURE — A4217 STERILE WATER/SALINE, 500 ML: HCPCS | Performed by: OBSTETRICS & GYNECOLOGY

## 2023-03-01 PROCEDURE — 36415 COLL VENOUS BLD VENIPUNCTURE: CPT

## 2023-03-01 PROCEDURE — 80048 BASIC METABOLIC PNL TOTAL CA: CPT

## 2023-03-01 PROCEDURE — 86900 BLOOD TYPING SEROLOGIC ABO: CPT

## 2023-03-01 PROCEDURE — 7100000001 HC PACU RECOVERY - ADDTL 15 MIN: Performed by: OBSTETRICS & GYNECOLOGY

## 2023-03-01 PROCEDURE — 2500000003 HC RX 250 WO HCPCS: Performed by: ANESTHESIOLOGY

## 2023-03-01 PROCEDURE — 88305 TISSUE EXAM BY PATHOLOGIST: CPT

## 2023-03-01 PROCEDURE — 7100000011 HC PHASE II RECOVERY - ADDTL 15 MIN: Performed by: OBSTETRICS & GYNECOLOGY

## 2023-03-01 RX ORDER — DEXAMETHASONE SODIUM PHOSPHATE 4 MG/ML
INJECTION, SOLUTION INTRA-ARTICULAR; INTRALESIONAL; INTRAMUSCULAR; INTRAVENOUS; SOFT TISSUE PRN
Status: DISCONTINUED | OUTPATIENT
Start: 2023-03-01 | End: 2023-03-01 | Stop reason: SDUPTHER

## 2023-03-01 RX ORDER — FENTANYL CITRATE 50 UG/ML
50 INJECTION, SOLUTION INTRAMUSCULAR; INTRAVENOUS EVERY 5 MIN PRN
Status: DISCONTINUED | OUTPATIENT
Start: 2023-03-01 | End: 2023-03-01 | Stop reason: HOSPADM

## 2023-03-01 RX ORDER — LABETALOL HYDROCHLORIDE 5 MG/ML
10 INJECTION, SOLUTION INTRAVENOUS
Status: DISCONTINUED | OUTPATIENT
Start: 2023-03-01 | End: 2023-03-01 | Stop reason: HOSPADM

## 2023-03-01 RX ORDER — LIDOCAINE HYDROCHLORIDE 20 MG/ML
INJECTION, SOLUTION EPIDURAL; INFILTRATION; INTRACAUDAL; PERINEURAL PRN
Status: DISCONTINUED | OUTPATIENT
Start: 2023-03-01 | End: 2023-03-01 | Stop reason: SDUPTHER

## 2023-03-01 RX ORDER — DOCUSATE SODIUM 100 MG/1
100 CAPSULE, LIQUID FILLED ORAL DAILY PRN
Qty: 60 CAPSULE | Refills: 1 | Status: SHIPPED | OUTPATIENT
Start: 2023-03-01

## 2023-03-01 RX ORDER — SODIUM CHLORIDE 9 MG/ML
INJECTION, SOLUTION INTRAVENOUS CONTINUOUS
Status: DISCONTINUED | OUTPATIENT
Start: 2023-03-01 | End: 2023-03-01 | Stop reason: HOSPADM

## 2023-03-01 RX ORDER — LIDOCAINE HYDROCHLORIDE 10 MG/ML
0.5 INJECTION, SOLUTION EPIDURAL; INFILTRATION; INTRACAUDAL; PERINEURAL ONCE
Status: DISCONTINUED | OUTPATIENT
Start: 2023-03-01 | End: 2023-03-01 | Stop reason: HOSPADM

## 2023-03-01 RX ORDER — OXYCODONE HYDROCHLORIDE 5 MG/1
5 TABLET ORAL
Status: COMPLETED | OUTPATIENT
Start: 2023-03-01 | End: 2023-03-01

## 2023-03-01 RX ORDER — FENTANYL CITRATE 50 UG/ML
INJECTION, SOLUTION INTRAMUSCULAR; INTRAVENOUS PRN
Status: DISCONTINUED | OUTPATIENT
Start: 2023-03-01 | End: 2023-03-01 | Stop reason: SDUPTHER

## 2023-03-01 RX ORDER — PROPOFOL 10 MG/ML
INJECTION, EMULSION INTRAVENOUS PRN
Status: DISCONTINUED | OUTPATIENT
Start: 2023-03-01 | End: 2023-03-01 | Stop reason: SDUPTHER

## 2023-03-01 RX ORDER — LIDOCAINE HYDROCHLORIDE 10 MG/ML
1 INJECTION, SOLUTION EPIDURAL; INFILTRATION; INTRACAUDAL; PERINEURAL
Status: DISCONTINUED | OUTPATIENT
Start: 2023-03-01 | End: 2023-03-01 | Stop reason: HOSPADM

## 2023-03-01 RX ORDER — MEPERIDINE HYDROCHLORIDE 25 MG/ML
12.5 INJECTION INTRAMUSCULAR; INTRAVENOUS; SUBCUTANEOUS EVERY 5 MIN PRN
Status: DISCONTINUED | OUTPATIENT
Start: 2023-03-01 | End: 2023-03-01 | Stop reason: HOSPADM

## 2023-03-01 RX ORDER — KETOROLAC TROMETHAMINE 30 MG/ML
INJECTION, SOLUTION INTRAMUSCULAR; INTRAVENOUS PRN
Status: DISCONTINUED | OUTPATIENT
Start: 2023-03-01 | End: 2023-03-01 | Stop reason: SDUPTHER

## 2023-03-01 RX ORDER — APREPITANT 40 MG/1
40 CAPSULE ORAL ONCE
Status: COMPLETED | OUTPATIENT
Start: 2023-03-01 | End: 2023-03-01

## 2023-03-01 RX ORDER — ACETAMINOPHEN 500 MG
1000 TABLET ORAL 3 TIMES DAILY
Qty: 90 TABLET | Refills: 3 | Status: SHIPPED | OUTPATIENT
Start: 2023-03-01 | End: 2023-03-31

## 2023-03-01 RX ORDER — MIDAZOLAM HYDROCHLORIDE 1 MG/ML
INJECTION INTRAMUSCULAR; INTRAVENOUS PRN
Status: DISCONTINUED | OUTPATIENT
Start: 2023-03-01 | End: 2023-03-01 | Stop reason: SDUPTHER

## 2023-03-01 RX ORDER — ACETAMINOPHEN 500 MG
1000 TABLET ORAL 3 TIMES DAILY
Qty: 120 TABLET | Refills: 3 | OUTPATIENT
Start: 2023-03-01 | End: 2023-03-31

## 2023-03-01 RX ORDER — CLINDAMYCIN PHOSPHATE 900 MG/50ML
900 INJECTION INTRAVENOUS
Status: COMPLETED | OUTPATIENT
Start: 2023-03-01 | End: 2023-03-01

## 2023-03-01 RX ORDER — ONDANSETRON 2 MG/ML
4 INJECTION INTRAMUSCULAR; INTRAVENOUS
Status: DISCONTINUED | OUTPATIENT
Start: 2023-03-01 | End: 2023-03-01 | Stop reason: HOSPADM

## 2023-03-01 RX ORDER — IBUPROFEN 800 MG/1
800 TABLET ORAL EVERY 6 HOURS PRN
Qty: 90 TABLET | Refills: 3 | Status: SHIPPED | OUTPATIENT
Start: 2023-03-01 | End: 2023-03-31

## 2023-03-01 RX ORDER — ONDANSETRON 2 MG/ML
INJECTION INTRAMUSCULAR; INTRAVENOUS PRN
Status: DISCONTINUED | OUTPATIENT
Start: 2023-03-01 | End: 2023-03-01 | Stop reason: SDUPTHER

## 2023-03-01 RX ORDER — MAGNESIUM HYDROXIDE 1200 MG/15ML
LIQUID ORAL CONTINUOUS PRN
Status: COMPLETED | OUTPATIENT
Start: 2023-03-01 | End: 2023-03-01

## 2023-03-01 RX ORDER — OXYCODONE HYDROCHLORIDE 5 MG/1
5 TABLET ORAL EVERY 6 HOURS PRN
Qty: 10 TABLET | Refills: 0 | Status: SHIPPED | OUTPATIENT
Start: 2023-03-01 | End: 2023-03-06

## 2023-03-01 RX ORDER — LEVOTHYROXINE SODIUM 0.2 MG/1
200 TABLET ORAL DAILY
Qty: 30 TABLET | Refills: 3 | Status: SHIPPED | OUTPATIENT
Start: 2023-03-01 | End: 2023-03-31

## 2023-03-01 RX ORDER — BUPIVACAINE HYDROCHLORIDE 5 MG/ML
INJECTION, SOLUTION EPIDURAL; INTRACAUDAL
Status: COMPLETED | OUTPATIENT
Start: 2023-03-01 | End: 2023-03-01

## 2023-03-01 RX ORDER — SODIUM CHLORIDE, SODIUM LACTATE, POTASSIUM CHLORIDE, CALCIUM CHLORIDE 600; 310; 30; 20 MG/100ML; MG/100ML; MG/100ML; MG/100ML
INJECTION, SOLUTION INTRAVENOUS CONTINUOUS
Status: DISCONTINUED | OUTPATIENT
Start: 2023-03-01 | End: 2023-03-01 | Stop reason: HOSPADM

## 2023-03-01 RX ORDER — HYDRALAZINE HYDROCHLORIDE 20 MG/ML
10 INJECTION INTRAMUSCULAR; INTRAVENOUS
Status: DISCONTINUED | OUTPATIENT
Start: 2023-03-01 | End: 2023-03-01 | Stop reason: HOSPADM

## 2023-03-01 RX ADMIN — KETOROLAC TROMETHAMINE 30 MG: 30 INJECTION, SOLUTION INTRAMUSCULAR; INTRAVENOUS at 11:37

## 2023-03-01 RX ADMIN — FENTANYL CITRATE 50 MCG: 50 INJECTION, SOLUTION INTRAMUSCULAR; INTRAVENOUS at 11:24

## 2023-03-01 RX ADMIN — SODIUM CHLORIDE: 9 INJECTION, SOLUTION INTRAVENOUS at 11:14

## 2023-03-01 RX ADMIN — PROPOFOL 150 MG: 10 INJECTION, EMULSION INTRAVENOUS at 11:20

## 2023-03-01 RX ADMIN — ONDANSETRON 4 MG: 2 INJECTION INTRAMUSCULAR; INTRAVENOUS at 11:20

## 2023-03-01 RX ADMIN — MIDAZOLAM 2 MG: 1 INJECTION INTRAMUSCULAR; INTRAVENOUS at 11:14

## 2023-03-01 RX ADMIN — OXYCODONE HYDROCHLORIDE 5 MG: 5 TABLET ORAL at 13:40

## 2023-03-01 RX ADMIN — PHENYLEPHRINE HYDROCHLORIDE 100 MCG: 10 INJECTION INTRAVENOUS at 11:29

## 2023-03-01 RX ADMIN — DEXAMETHASONE SODIUM PHOSPHATE 4 MG: 4 INJECTION, SOLUTION INTRAMUSCULAR; INTRAVENOUS at 11:20

## 2023-03-01 RX ADMIN — APREPITANT 40 MG: 40 CAPSULE ORAL at 10:11

## 2023-03-01 RX ADMIN — CLINDAMYCIN PHOSPHATE 900 MG: 900 INJECTION, SOLUTION INTRAVENOUS at 11:12

## 2023-03-01 RX ADMIN — LIDOCAINE HYDROCHLORIDE 100 MG: 20 INJECTION, SOLUTION EPIDURAL; INFILTRATION; INTRACAUDAL; PERINEURAL at 11:20

## 2023-03-01 ASSESSMENT — LIFESTYLE VARIABLES: SMOKING_STATUS: 0

## 2023-03-01 ASSESSMENT — PAIN - FUNCTIONAL ASSESSMENT: PAIN_FUNCTIONAL_ASSESSMENT: NONE - DENIES PAIN

## 2023-03-01 NOTE — PROGRESS NOTES
Patient up to bathroom. Tolerating well. Pain pill given as ordered. Patient getting dressed at this time. Verbalizes readiness to leave.

## 2023-03-01 NOTE — PROGRESS NOTES
Patient in phase 2. VSS.  at bedside. Requesting pain meds. Gave po fluids and food for patient to tolerate before.  Denies any other needs

## 2023-03-01 NOTE — H&P
Department of Gynecology   Pre-operative History and Physical        DIAGNOSIS:  left labia enlargement    PLANNED PROCEDURE:  left labiaplasty    Reason for Admission:  surgery    History obtained from patient    Past Medical History:    Past Medical History:   Diagnosis Date    Diabetes mellitus ([address]     Hyperlipidemia     Hypertension     JOSUE (nonalcoholic steatohepatitis)     PCOS (polycystic ovarian syndrome)     PONV (postoperative nausea and vomiting)     Prolonged emergence from general anesthesia     Purpura allergic     Thyroid disorder     hypothyroid        Past Surgical History:    Past Surgical History:   Procedure Laterality Date     SECTION N/A 11/15/2021     SECTION performed by Duarte Maurice MD at [facility] L&[facility] OR    CHOLECYSTECTOMY      EYE SURGERY      cyst removed from right        Medications Prior to Admission:  Medications Prior to Admission: lisinopril (PRINIVIL;ZESTRIL) 10 MG tablet, Take 10 mg by mouth daily  atorvastatin (LIPITOR) 20 MG tablet, nightly  metFORMIN (GLUCOPHAGE) 1000 MG tablet, TAKE 1 TABLET TWICE A DAY WITH MEALS  allopurinol (ZYLOPRIM) 100 MG tablet, Take 100 mg by mouth nightly  levothyroxine (SYNTHROID) 300 MCG tablet, Take 1 tablet by mouth Daily (Patient taking differently: Take 200 mcg by mouth Daily)  insulin lispro, 1 Unit Dial, 100 UNIT/ML SOPN, Inject 10 Units into the skin 3 times daily (before meals) 10 units SQ before breakfast then 14 units sq before lunch and dinner (Patient not taking: Reported on 2023)  insulin NPH (HUMULIN N;NOVOLIN N) 100 UNIT/ML injection pen, Inject 15 Units into the skin nightly Indications: 60u breakfast 62u bedtime (Patient not taking: Reported on 2023)  NIFEdipine (PROCARDIA XL) 30 MG extended release tablet, Take 2 tablets by mouth daily (Patient not taking: No sig reported)  acetaminophen (TYLENOL) 500 MG tablet, Take 2 tablets by mouth 3 times daily (Patient taking differently: Take 1,000 mg by mouth 3 times daily as needed)  ibuprofen (ADVIL;MOTRIN) 800 MG tablet, Take 1 tablet by mouth every 6 hours as needed for Pain  docusate sodium (COLACE) 100 MG capsule, Take 1 capsule by mouth daily as needed for Constipation (Patient not taking: Reported on 2/23/2023)  Prenatal Vit-Fe Fumarate-FA (PRENATAL VITAMIN PO), Take 1 tablet by mouth daily (Patient not taking: Reported on 2/23/2023)     Allergies: Allergies   Allergen Reactions    Cephalexin Hives     Keflex    Other Hives    Sulfamethoxazole-Trimethoprim Hives    Hydromorphone Photosensitivity     Not a true allergy but side effects that bother her. Vision changes, blackened vision. Labetalol Other (See Comments)     Breast tissue tense and pain in nipples, like \"terri horse\". Spoke to [facility] and they told her to stop medication immediately. 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 Her smoking use included cigarettes. She smoked an average of 1 pack per day. She has never used smokeless tobacco. She reports that she does not drink alcohol and does not use drugs. PHYSICAL EXAM:    Patient Vitals for the past 24 hrs:   BP Temp Temp src Pulse Resp SpO2 Weight   03/01/23 0956 (!) 140/89 97.6 °F (36.4 °C) Temporal (!) 104 20 97 % 228 lb (103.4 kg)      General appearance - alert, well appearing, and in no distress  Chest - clear to auscultation, no wheezes, rales or rhonchi, symmetric air entry  Heart - normal rate, regular rhythm, normal S1, S2, no murmurs, rubs, clicks or gallops  Abdomen - soft, nontender, nondistended, no masses or organomegaly     ASSESSMENT AND PLAN:      1. Left labiaplasty      2. Procedure options, risks and benefits reviewed with patient. Patient expresses understanding.

## 2023-03-01 NOTE — PROGRESS NOTES
Patient awake, VSS, denies pain, phase I discharge criteria met, will transfer to [facility].

## 2023-03-01 NOTE — DISCHARGE INSTRUCTIONS
Call Surgeon if you have:  Temperature greater than 100.4  Persistent nausea and vomiting  Severe uncontrolled pain  Redness, tenderness, or signs of infection (pain, swelling, redness, odor or green/yellow discharge around the site)  Difficulty breathing, headache or visual disturbances  Hives  Persistent dizziness or light-headedness  Extreme fatigue  Any other questions or concerns you may have after discharge    In an emergency, call 911 or go to an Emergency Department at a nearby hospital    It is important to bring a complete, current list of your medications to any medical appointments or hospitalizations. REMINDER:   Carry a list of your medications and allergies with you at all times  Call your pharmacy at least 1 week in advance to refill prescriptions    Diet: Resume your usual diet. Good nutrition promotes healing. Increase fluid intake. Supplies & Equipment: n/a  Education Materials Received: yes  Belongings Returned: no  Home medications Returned:n/a    Follow up 1 weeks. Call for appointment. The discharge instructions have been reviewed with the patient and/or Guardian. Patient and/or Guardian signed and retained a printed copy. All home medications have been reviewed, questions answered and patient voiced understanding    Your information:  Name: Carol Alvarez  : 1990    Your instructions:    Can shower tomorrow. No baths until healed. What to do after you leave the hospital:    Recommended diet: regular diet    Recommended activity: Don't lift anything heavier than 5 pounds or use a vacuum  until it's ok with your doctor; Don't drive until your doctor says it's okay; if you ride in a car for more than short trips, stop frequently to stretch your legs; Ask your doctor when you can return to work. This should be within 6 - 8 weeks after surgery, depending on the kind of work you do;  Increase your activity gradually, take short walks on a level surface. If you experience any of the following symptoms: blood in your stool, hard stool, or no gas or stool; fever greater than 101; chills; redness, swelling, bleeding,or drainage from your incision; constipation or diarrhea; nausea or vomiting; increased pain please follow up with Dr. Juhi Sewell    Follow-up in 1 weeks. Call for appointment. The following personal items were collected during your admission and were returned to you:    Belongings  Dental Appliances: None  Vision - Corrective Lenses: Eyeglasses  Hearing Aid: None  Clothing: Footwear, Pants, Shirt, Socks, Slippers  Jewelry: None  Body Piercings Removed: N/A  Electronic Devices: None  Weapons (Notify Protective Services/Security): None  Home Medications: None  Valuables Given To: Letha  Provide Name(s) of Who Valuable(s) Were Given To: letha Schmitz  Responsible person(s) in the waiting room: Jermaine Farmer [phone]  Patient approves for provider to speak to responsible person post operatively: Yes      We always want to make sure you have the help you need when you go home. If you do not feel you have the help you need when you go home then please ask your nurse or discharge planner prior to leaving the hospital.    We thank you for choosing [facility]. We hope that you had a positive experience and that we provided the very best care during your stay. By signing below, I understand that if any problems occur once I leave the hospital I am to contact Dr. Juhi Sewell. I understand and acknowledge receipt of the instructions indicated above. ANESTHESIA DISCHARGE INSTRUCTIONS    Wear your seatbelt home. You are under the influence of drugs-do not drink alcohol, drive, operate machinery, make any important decisions or sign any legal documents for 24 hours. Children should not ride bikes, [place] or play on gym sets for 24 hours after surgery. A responsible adult needs to be with you for 24 hours.   You may experience lightheadedness, dizziness, or sleepiness following surgery. Rest at home today- increase activity as tolerated. Progress slowly to a regular diet unless your physician has instructed you otherwise. Drink plenty of water. If persistent nausea and vomiting becomes a problem, call your physician. Coughing, sore throat and muscle aches are other side effects of anesthesia, and should improve with time. Do not drive or operate machinery while taking narcotics. Females of childbearing potential and on hormonal birth control, should use two forms of contraception following procedure if given a medication called sugammadex and/or emend. Additional contraception should be used for 7 days for sugammadex and/or 28 days for emend. These medications have a potential to reduce the effectiveness of hormonal birth control.

## 2023-03-01 NOTE — ANESTHESIA PRE PROCEDURE
Department of Anesthesiology  Preprocedure Note       Name:  Jordan Terrell   Age:  34 y.o.  :  1990                                          MRN:  [id number]         Date:  3/1/2023      Surgeon: Tevin Sierra):  Itzel Tsai MD    Procedure: Procedure(s):  LEFT LABIOPLASTY    Medications prior to admission:   Prior to Admission medications    Medication Sig Start Date End Date Taking?  Authorizing Provider   lisinopril (PRINIVIL;ZESTRIL) 10 MG tablet Take 10 mg by mouth daily   Yes Historical Provider, MD   atorvastatin (LIPITOR) 20 MG tablet nightly 21  Yes Historical Provider, MD   metFORMIN (GLUCOPHAGE) 1000 MG tablet TAKE 1 TABLET TWICE A DAY WITH MEALS 3/17/21  Yes Historical Provider, MD   allopurinol (ZYLOPRIM) 100 MG tablet Take 100 mg by mouth nightly   Yes Historical Provider, MD   levothyroxine (SYNTHROID) 300 MCG tablet Take 1 tablet by mouth Daily  Patient taking differently: Take 200 mcg by mouth Daily 21   Gallito Jones MD   insulin lispro, 1 Unit Dial, 100 UNIT/ML SOPN Inject 10 Units into the skin 3 times daily (before meals) 10 units SQ before breakfast then 14 units sq before lunch and dinner  Patient not taking: Reported on 21   Gallito Jones MD   insulin NPH (HUMULIN N;NOVOLIN N) 100 UNIT/ML injection pen Inject 15 Units into the skin nightly Indications: 60u breakfast 62u bedtime  Patient not taking: Reported on 21   Gallito Jones MD   NIFEdipine (PROCARDIA XL) 30 MG extended release tablet Take 2 tablets by mouth daily  Patient not taking: No sig reported 21   Gallito Jones MD   acetaminophen (TYLENOL) 500 MG tablet Take 2 tablets by mouth 3 times daily  Patient taking differently: Take 1,000 mg by mouth 3 times daily as needed 11/15/21 12/15/21  Itzel Tsai MD   ibuprofen (ADVIL;MOTRIN) 800 MG tablet Take 1 tablet by mouth every 6 hours as needed for Pain 11/15/21   tIzel Tsai MD   docusate sodium (COLACE) 100 MG capsule Take 1 capsule by mouth daily as needed for Constipation  Patient not taking: Reported on 2023 11/15/21   Brandon Cristobal MD   Prenatal Vit-Fe Fumarate-FA (PRENATAL VITAMIN PO) Take 1 tablet by mouth daily  Patient not taking: Reported on 2023    Historical Provider, MD       Current medications:    Current Facility-Administered Medications   Medication Dose Route Frequency Provider Last Rate Last Admin    0.9 % sodium chloride infusion   IntraVENous Continuous Brandon Cristobal MD        lidocaine PF 1 % injection 0.5 mL  0.5 mL IntraDERmal Once Brandon Cristobal MD        clindamycin (CLEOCIN) 900 mg in dextrose 5 % 50 mL IVPB  900 mg IntraVENous On Call to OR Brandon Cristobal MD           Allergies: Allergies   Allergen Reactions    Cephalexin Hives     Keflex    Other Hives    Sulfamethoxazole-Trimethoprim Hives    Hydromorphone Photosensitivity     Not a true allergy but side effects that bother her. Vision changes, blackened vision.  Labetalol Other (See Comments)     Breast tissue tense and pain in nipples, like \"terri horse\". Spoke to [facility] and they told her to stop medication immediately.  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     Phenergan [Promethazine Hcl] Hives    Promethazine Hcl     Morphine Nausea And Vomiting and Anxiety     More side effects       Problem List:    Patient Active Problem List   Diagnosis Code    Pregestational diabetes mellitus, modified White class B O21.18    Hypothyroid in pregnancy, antepartum, third trimester O99.283, E03.9    Obesity affecting pregnancy in third trimester, antepartum O99.213    Chronic hypertension with superimposed pre-eclampsia O11.9    Status post primary low transverse  section Z98.891       Past Medical History:        Diagnosis Date    Diabetes mellitus ([facility]     Hyperlipidemia     Hypertension     JOSUE (nonalcoholic steatohepatitis)     PCOS (polycystic ovarian syndrome)     PONV (postoperative nausea and vomiting)     Prolonged emergence from general anesthesia     Purpura allergic     Thyroid disorder     hypothyroid       Past Surgical History:        Procedure Laterality Date     SECTION N/A 11/15/2021     SECTION performed by Jesús Valles MD at [facility] L[facility]    CHOLECYSTECTOMY      EYE SURGERY      cyst removed from right       Social History:    Social History     Tobacco Use    Smoking status: Former     Packs/day: 1.00     Types: Cigarettes     Quit date: 2018     Years since quittin.1    Smokeless tobacco: Never   Substance Use Topics    Alcohol use: Never                                Counseling given: Not Answered      Vital Signs (Current):   Vitals:    23 1031 23 0956   BP:  (!) 140/89   Pulse:  (!) 104   Resp:  20   Temp:  97.6 °F (36.4 °C)   TempSrc:  Temporal   SpO2:  97%   Weight: 228 lb (103.4 kg) 228 lb (103.4 kg)   Height: 5' 2\" (1.575 m)                                               BP Readings from Last 3 Encounters:   23 (!) 140/89   21 (!) 131/93   11/15/21 127/70       NPO Status: Time of last liquid consumption: 0000                        Time of last solid consumption: 0000                        Date of last liquid consumption: 23                        Date of last solid food consumption: 23    BMI:   Wt Readings from Last 3 Encounters:   23 228 lb (103.4 kg)   21 235 lb 3.2 oz (106.7 kg)   21 269 lb (122 kg)     Body mass index is 41.7 kg/m².     CBC:   Lab Results   Component Value Date/Time    WBC 11.9 2021 06:01 AM    RBC 4.58 2021 06:01 AM    HGB 12.4 2021 06:01 AM    HCT 36.8 2021 06:01 AM    MCV 80.5 2021 06:01 AM    RDW 14.3 2021 06:01 AM     2021 06:01 AM       CMP:   Lab Results   Component Value Date/Time     2021 06:01 AM    K 4.5 2021 06:01 AM    K 3.7 2021 09:53 AM    CL 103 11/16/2021 06:01 AM    CO2 22 11/16/2021 06:01 AM    BUN 10 11/16/2021 06:01 AM    CREATININE <0.5 11/16/2021 06:01 AM    GFRAA >60 11/16/2021 06:01 AM    AGRATIO 1.0 11/16/2021 06:01 AM    LABGLOM >60 11/16/2021 06:01 AM    GLUCOSE 98 11/16/2021 06:01 AM    PROT 6.1 11/16/2021 06:01 AM    CALCIUM 7.9 11/16/2021 06:01 AM    BILITOT 0.6 11/16/2021 06:01 AM    ALKPHOS 158 11/16/2021 06:01 AM    AST 22 11/16/2021 06:01 AM    ALT 16 11/16/2021 06:01 AM       POC Tests: No results for input(s): POCGLU, POCNA, POCK, POCCL, POCBUN, POCHEMO, POCHCT in the last 72 hours. Coags: No results found for: PROTIME, INR, APTT    HCG (If Applicable):   Lab Results   Component Value Date    PREGTESTUR Negative 12/06/2014        ABGs: No results found for: PHART, PO2ART, BTP6XNK, HSO5KGR, BEART, M3SBGHFV     Type & Screen (If Applicable):  No results found for: LABABO, LABRH    Drug/Infectious Status (If Applicable):  No results found for: HIV, HEPCAB    COVID-19 Screening (If Applicable): No results found for: COVID19        Anesthesia Evaluation  Patient summary reviewed and Nursing notes reviewed   history of anesthetic complications (PONV, prolonged emergence): PONV.   Airway: Mallampati: I  TM distance: >3 FB   Neck ROM: full  Mouth opening: > = 3 FB   Dental: normal exam         Pulmonary:Negative Pulmonary ROS and normal exam  breath sounds clear to auscultation      (-) COPD, asthma, sleep apnea and not a current smoker                           Cardiovascular:    (+) hypertension:, hyperlipidemia    (-) past MI, CAD, CABG/stent, dysrhythmias,  angina and  CHF    ECG reviewed  Rhythm: regular  Rate: normal                    Neuro/Psych:   Negative Neuro/Psych ROS     (-) seizures, TIA and CVA           GI/Hepatic/Renal:   (+) liver disease (JOSUE):, morbid obesity     (-) GERD and no renal disease       Endo/Other:    (+) Diabetes (a1c 6.5)Type II DM, using insulin, hypothyroidism: arthritis (Gout):., . Abdominal:             Vascular: Other Findings:           Anesthesia Plan      general     ASA 3       Induction: intravenous. MIPS: Postoperative opioids intended and Prophylactic antiemetics administered. Anesthetic plan and risks discussed with patient. Plan discussed with CRNA.                     Becky Joyner MD   3/1/2023

## 2023-03-01 NOTE — ANESTHESIA POSTPROCEDURE EVALUATION
Department of Anesthesiology  Postprocedure Note    Patient: Ivet Cantrell  MRN: [id number]  YOB: 1990  Date of evaluation: 3/1/2023      Procedure Summary     Date: 03/01/23 Room / Location: [address]    Anesthesia Start: 1114 Anesthesia Stop: 1205    Procedure: LEFT LABIOPLASTY (Left: Vagina ) Diagnosis:       Labial hypertrophy      (N90.6  LEFT LABIAL HYPERTROPHY)    Surgeons: Jeanette Ray MD Responsible Provider: Chaz Champion MD    Anesthesia Type: general ASA Status: 3          Anesthesia Type: No value filed.     Michael Phase I: Michael Score: 8    Mcihael Phase II:        Anesthesia Post Evaluation    Patient location during evaluation: PACU  Patient participation: complete - patient participated  Level of consciousness: awake and alert  Airway patency: patent  Nausea & Vomiting: no nausea and no vomiting  Complications: no  Cardiovascular status: hemodynamically stable  Respiratory status: acceptable  Hydration status: stable  Multimodal analgesia pain management approach

## 2023-03-01 NOTE — BRIEF OP NOTE
Department of Gynecology  Brief Operative Report        Pre-operative Diagnosis:  enlarged left labia    Post-operative Diagnosis:  same    Procedure:  left labiaplasty        Anesthesia:  General Laryngeal Mask Airway Anesthesia    Findings:  enlarged left labia    Estimated blood loss:  less than 50ml    Blood Transfusion?:  No     Drains:  none    Specimens:  left labia    Complications:  none    Condition:  good    See dictated operative report for full details.

## 2023-03-01 NOTE — PROGRESS NOTES
Went over over d/c instructions via downtime paperwork, with patient and husbands. All questions answered. Patients  getting car.

## 2023-03-02 NOTE — OP NOTE
[facility]                     [address] [facility], [address] [place] [address]                                OPERATIVE REPORT    PATIENT NAME: Robert Rivas        :        1990  MED REC NO:   [id number]                          ROOM:  ACCOUNT NO:   [de-identified]                           ADMIT DATE: 2023  PROVIDER: Carlos Escobar MD    DATE OF PROCEDURE:  2023    PREOPERATIVE DIAGNOSIS:  Large left labia. POSTOPERATIVE DIAGNOSIS:  Large left labia. OPERATION PERFORMED:  Left labioplasty. SURGEON:  Carlos Escobar MD    ANESTHESIA:  LMA. ESTIMATED BLOOD LOSS:  Minimal.    COMPLICATIONS:  None. SPECIMENS:  Left labia. INDICATIONS:  This is a 39-year-old female who has an enlarged left  labia that causes pain with activity and sexual intercourse. She  desired removal.  Risks, benefits, and alternatives were discussed at  length. OPERATIVE PROCEDURE:  The patient was brought back to the operating  room. Anesthesia was found to be adequate. She was prepped and draped  in the normal sterile fashion placed in the dorsal lithotomy position. Her bladder was drained. The left labia was inspected and enlarged. Distal portion was noted to be 5 cm in size. Using 0.25% Marcaine  without epinephrine, the areas were infiltrated. Scissors were then  used to remove the enlarged portion of labia. Subcuticular 3-0 Vicryl  was then used to reapproximate the skin. Two interrupted 3-0 Vicryl  were then used for hemostasis. Following this, the operative site was  hemostatic. The patient was sent to PACU in a stable condition.         Maciel Jeffrey MD    D: 2023 12:38:53       T: 2023 20:44:21     [id number]  Job#: [id number]     Doc#: [id number]    CC:

## 2023-04-03 NOTE — FLOWSHEET NOTE
Pt states she feels fetal movements. Pt given rhogam injection into rvg space, tolerated well. Given rhogam card and then discharged home undelivered in good condition. no

## (undated) DEVICE — SUTURE MCRYL SZ 4-0 L18IN ABSRB UD L19MM PS-2 3/8 CIR PRIM Y496G

## (undated) DEVICE — 9165 UNIVERSAL PATIENT PLATE: Brand: 3M™

## (undated) DEVICE — WOUND RETRACTOR AND PROTECTOR: Brand: ALEXIS WOUND PROTECTOR-RETRACTOR

## (undated) DEVICE — APPLICATOR MEDICATED 26 CC SOLUTION HI LT ORNG CHLORAPREP

## (undated) DEVICE — TRAY URIN CATH 16FR DRNGE BG STATLOK STBL DEV F SURSTP

## (undated) DEVICE — NEEDLE,22GX1.5",REG,BEVEL: Brand: MEDLINE

## (undated) DEVICE — ELECTRODE PT RET AD L9FT HI MOIST COND ADH HYDRGEL CORDED

## (undated) DEVICE — BLADE CLIPPER GEN PURP NS

## (undated) DEVICE — PAD,NON-ADHERENT,3X8,STERILE,LF,1/PK: Brand: MEDLINE

## (undated) DEVICE — D & C-LF: Brand: MEDLINE INDUSTRIES, INC.

## (undated) DEVICE — INTENDED FOR TISSUE SEPARATION, AND OTHER PROCEDURES THAT REQUIRE A SHARP SURGICAL BLADE TO PUNCTURE OR CUT.: Brand: BARD-PARKER ® STAINLESS STEEL BLADES

## (undated) DEVICE — Z CONVERTED USE 2275207 CLOTH PREP W7.5XL7.5IN 2% CHG SKIN ALC AND RNS FREE

## (undated) DEVICE — SUTURE VCRL + SZ 3-0 L27IN ABSRB UD L26MM SH 1/2 CIR VCP416H

## (undated) DEVICE — STAPLER SKIN SQ 30 ABSRB STPL DISP INSORB

## (undated) DEVICE — MERCY FAIRFIELD TURNOVER KIT: Brand: MEDLINE INDUSTRIES, INC.

## (undated) DEVICE — STERILE LATEX POWDER-FREE SURGICAL GLOVESWITH NITRILE COATING: Brand: PROTEXIS

## (undated) DEVICE — LIGHT HANDLE: Brand: DEVON

## (undated) DEVICE — ADHESIVE SKIN CLOSURE TOP 36 CC HI VISC DERMBND MINI

## (undated) DEVICE — PAD,ABDOMINAL,8"X10",ST,LF: Brand: MEDLINE

## (undated) DEVICE — SHEET,DRAPE,53X77,STERILE: Brand: MEDLINE

## (undated) DEVICE — SURE SET SINGLE BASIN-LF: Brand: MEDLINE INDUSTRIES, INC.

## (undated) DEVICE — TUBING, SUCTION, 1/4" X 10', STRAIGHT: Brand: MEDLINE

## (undated) DEVICE — BLANKET WRM W29.9XL79.1IN UP BODY FORC AIR MISTRAL-AIR

## (undated) DEVICE — SUTURE VCRL SZ 3-0 L27IN ABSRB UD L36MM CT-1 1/2 CIR J258H

## (undated) DEVICE — SOLUTION IRRIG 1000ML 0.9% SOD CHL USP POUR PLAS BTL

## (undated) DEVICE — GAUZE,SPONGE,4"X4",8PLY,STRL,LF,10/TRAY: Brand: MEDLINE

## (undated) DEVICE — 50" SINGLE PATIENT USE HOVERMATT: Brand: SINGLE PATIENT USE HOVERMATT

## (undated) DEVICE — 3M™ STERI-STRIP™ REINFORCED ADHESIVE SKIN CLOSURES, R1549, 1/2 IN X 2 IN (12 MM X 50 MM), 6 STRIPS/ENVELOPE: Brand: 3M™ STERI-STRIP™

## (undated) DEVICE — PENCIL ES L3M BTTN SWCH S STL HEX LOK BLDE ELECTRD HOLSTER

## (undated) DEVICE — 3M™ STERI-STRIP™ COMPOUND BENZOIN TINCTURE 40 BAGS/CARTON 4 CARTONS/CASE C1544: Brand: 3M™ STERI-STRIP™

## (undated) DEVICE — GLOVE SURG SZ 65 THK91MIL LTX FREE SYN POLYISOPRENE

## (undated) DEVICE — PACK PROCEDURE SURG C SECT REV 1

## (undated) DEVICE — BANDAGE COMPR W3INXL5YD COT ZN OXIDE TAN E ADH HVYWT

## (undated) DEVICE — GOWN,PREVENTION PLUS,XL,ST,24/CS: Brand: MEDLINE

## (undated) DEVICE — NON-ADHERENT PAD: Brand: TELFA

## (undated) DEVICE — GARMENT,MEDLINE,DVT,INT,CALF,MED, GEN2: Brand: MEDLINE

## (undated) DEVICE — HYPODERMIC SAFETY NEEDLE: Brand: MAGELLAN

## (undated) DEVICE — WET SKIN PREP TRAY: Brand: MEDLINE INDUSTRIES, INC.

## (undated) DEVICE — PENCIL SMK EVAC L10FT TBNG NONSTICK ESU BLDE PLUMEPEN ELITE

## (undated) DEVICE — SUTURE COAT VCRL SZ 0 L36IN ABSRB VLT CTX L48MM TAPERPOINT J370H

## (undated) DEVICE — SYRINGE, LUER LOCK, 10ML: Brand: MEDLINE

## (undated) DEVICE — SUTURE VCRL SZ 0 L36IN ABSRB UD L36MM CT-1 1/2 CIR J946H